# Patient Record
Sex: FEMALE | Race: WHITE | NOT HISPANIC OR LATINO | Employment: STUDENT | URBAN - METROPOLITAN AREA
[De-identification: names, ages, dates, MRNs, and addresses within clinical notes are randomized per-mention and may not be internally consistent; named-entity substitution may affect disease eponyms.]

---

## 2023-06-13 NOTE — PROGRESS NOTES
[facility]  CF Clinic - Initial Visit -  June 13, 2023  Reason for Visit  Valencia Casiano is 17 year old who is being seen for Cystic Fibrosis (F/u)         Assessment and Plan:   Valencia Casiano is a 17 year old with history of CF, [facility], who is seen today for evaluation of CF.     CF Pulmonary Disease:  Maintenance   Modulator: Trikafta, misses 2-3 doses a month, started mid to early 2019  Mutation: [id number] sweat 104,105, dx age 1yo  AW Clearance: vest when needed  Bronchodilators: Albuterol nebs prn, and has albuterol HFA  Mucolytics: Pulmzyme, HTS 7% BID has not been doing these  Antibiotics Inh:   Antibiotics Oral:   Exercise: None scheduled  Colonization: MRSA from throat cultures, MSSA, Pseudomonas fluorescens, stenotrophomonas, aspergillus  Other: Symbicort 80 1 puff bid  Exocrine Pancreatic Insufficiency:  Creon 24 5 with meals 3-4 with snacks.     ABPA: Prior history, she does not recall if she was on chronic steroids. She is not currently on ICS or steroids.   History of Migraines: Better controlled on effexor.     Episodic Abdominal Pain: Saw Gi for intermittent acute abdominal pain with chronic constipation and bloating. She wonders if it's related to recurrent constipation and hasn't been using consistent miralax. Recurrent constipation worsened on trikafta. No hx of recurrent ANABELA as a child.   - Recommend miralax daily  - Repeat fecal elastase  - Has self downtitrated her Creon to 4 capsules with meals    Family Planning:  No intention to get pregnant super soon. However, she would like to get rid of her IUD. She has had nexplanon and had constant bleeding, she is inconsistent with OCPs. Not interested in discussion regarding further options. Would like genetic counseling referral for her and her partner.     Annuals: Missed some of them normally does them in February, discussed with Valencia and her mom and will  "plan for December.     June 16, 2023  CF Exacerbation  Absent        I personally spent 60 minutes in documentation, the interview and exam, and review of the chart/labs/imaging on Jun 16, 2023 not including time spent interpreting spirometry.     Charley Cassidy MD  [facility]  Cystic Fibrosis Center  RN Coordinators: Jumana [phone]        CF History of Present Illness:   Valencia Casiano is a 17 year old female with history of CF, recurrent abdominal pain/constipation (?ANABELA), who is seen today for evaluation of CF.   She is transferring from [facility].     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   Only uses the vest or airway clearance when she's sick. She hasn't used her vest in about 2 years. Not actively using any bronchodilators or ICS. Last time she needed antibiotics (oral) was like 4 years ago, and IVs also about 4 years ago and would need antibiotics about 2-3 times a year. Treated for ABPA about 6- 7 years ago with steroids and itraconazole, but no chronic steroids since then. Very rare cough, 1-2 times a week, occasionally productive of greenish yellow sputum but no blood and not persistent. She has received steroids in the past during exacerbations with some relief in chest tightness.   Sinus:   Has chronic postnasal drainage. Has a nasal spray, no ansomia or dysgeusia. Used to take zyrtec at one point. Notes that nasal congestion worsens during the summer. Used to get allergy shots. No sinus pressure. Alternating sides of nasal cognestion.   Migraines: Had them worse in the winter, sharp pain behind the right eye, +auras with \"stars\" during, effexor started with some improvement in frequency.   CFRD: She has had to use insulin when she would get steroids during exacerbations but not typically.   GI: Bloating all the time. She has cut back on her enzymes to 3-4 with meals and snacks. No change to bloating. She'll get bad stomach pain in her mid " upper epigastric region. She went to the ED and they gave her milk of mag  And x2 enemas and miralax. She is prescribed miralax and takes it about once a week. She has gone a week without pooping and has 0-1 episodes. If she forgets her enzymes she doesn't notice a difference in stools but does occasionally give her a stomach ache. Has intermittent acid reflux, maybe 2 episodes out of a month.     Inhalational activities: Vapes nicotine pods, has tried to quit without success, has not tried nicotine replacement is interested in quitting. Mother does not know, and probably has access to chart so asked not to share. Occasionally does 1-2 times a year smoking marijuana.   - Trialing nicotine patches      The patient was seen and examined by Charley Cassidy MD          Review of Systems:     Skin: negative  Eyes: negative  Ears/Nose/Throat: negative, as above  Respiratory: No shortness of breath, dyspnea on exertion, cough, or hemoptysis  Cardiovascular: negative  Gastrointestinal:  as above  Genitourinary: negative  Musculoskeletal: negative  Neurologic: negative  Psychiatric: negative  Hematologic/Lymphatic/Immunologic: negative  Endocrine: as above     A complete ROS was otherwise negative except as noted in the HPI.          Problem List:          Past Medical and Surgical History:     No past medical history on file.  No past surgical history on file.    PMH reviewed  CF   Recurrent constipation  Pancreatic insufficiency  ABPA        Family History:     2 siblings both younger, neither have CF, no other relatives with CF             Social History:     Social History     Socioeconomic History     Marital status: Single     Spouse name: Not on file     Number of children: Not on file     Years of education: Not on file     Highest education level: Not on file   Occupational History     Not on file   Tobacco Use     Smoking status: Not on file     Smokeless tobacco: Not on file   Substance and Sexual Activity      Alcohol use: Not on file     Drug use: Not on file     Sexual activity: Not on file   Other Topics Concern     Not on file   Social History Narrative     Not on file     Social Determinants of Health     Financial Resource Strain: Not on file   Food Insecurity: Not on file   Transportation Needs: Not on file   Physical Activity: Not on file   Stress: Not on file   Social Connections: Not on file   Intimate Partner Violence: Not on file   Housing Stability: Not on file     - 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 Seeing her boyfriend for 4 years.   - Dad has a traumatic brain injury so mom often accompanies her to appointments.        Medications:     No current outpatient medications on file prior to visit.  No current facility-administered medications on file prior to visit.    Medications reviewed with patient in clinic    Outpatient Encounter Medications as of 6/16/2023   Medication Sig Dispense Refill     albuterol (PROAIR HFA/PROVENTIL HFA/VENTOLIN HFA) 108 (90 Base) MCG/ACT inhaler Inhale 1-2 puffs into the lungs every 4 hours as needed for shortness of breath or wheezing 18 g 3     albuterol (PROVENTIL) (2.5 MG/3ML) 0.083% neb solution Take 1 vial (2.5 mg) by nebulization 4 times daily as needed for exacerbation 270 mL 3     cetirizine (ZYRTEC) 10 MG tablet Take 1 tablet (10 mg) by mouth daily 90 tablet 3     elexacaftor-tezacaftor-ivacaftor & ivacaftor (TRIKAFTA) 100-50-75 & 150 MG tablet pack Take 2 orange tablets in the morning and 1 light blue tablet in the evening. Swallow whole with fat-containing food. 84 tablet 5     fluticasone (FLONASE) 50 MCG/ACT nasal spray Spray 2 sprays into both nostrils daily as needed for rhinitis or allergies 16 g 11     lipase-protease-amylase (CREON) 51045-47707-770818 units CPEP per EC capsule Take 4 capsules by  mouth 3 times daily (with meals) and 4 capsules with snacks 3 times daily. 3 snacks/3 meals per day. 720 capsule 11     [START ON 7/28/2023] nicotine (NICODERM CQ) 14 MG/24HR 24 hr patch Place 1 patch onto the skin every 24 hours for 14 days 14 patch 0     nicotine (NICODERM CQ) 21 MG/24HR 24 hr patch Place 1 patch onto the skin every 24 hours for 42 days 42 patch 0     [START ON 8/12/2023] nicotine (NICODERM CQ) 7 MG/24HR 24 hr patch Place 1 patch onto the skin every 24 hours for 14 days 14 patch 0     polyethylene glycol (MIRALAX) 17 GM/Dose powder Take 17 g by mouth daily 510 g 11     sodium chloride inhalant 7 % NEBU neb solution Take 4 mLs by nebulization up to four times daily for exacerbations. 720 mL 3     No facility-administered encounter medications on file as of 6/16/2023.      Orders Placed This Encounter   Procedures     Spirometry, Breathing Capacity, Normal Order, Clinic Performed             Allergies:     Allergies   Allergen Reactions     Vancomycin Hives     Get's shoshana syndrome and itching: She tolerates it with slowing the infusion and premed with benadryl     Latex Itching     Latex tape- breaks out in hives             Physical Exam:   There were no vitals taken for this visit.    GENERAL: alert, NAD  HEENT: NCAT, EOMI, no scleral icterus, oral mucosa moist and without lesions  Neck: no cervical or supraclavicular adenopathy  Lungs: good air flow, no crackles, rhonchi or wheezing  CV: RRR, S1S2, no murmurs noted  Abdomen: normoactive BS, soft, non tender  Neuro: AAO X 3  Psychiatric: normal affect, good eye contact  Skin: no rash, jaundice or lesions on limited exam  Extremities: No clubbing, cyanosis or edema.  No digital edema, no synovitis or joint swelling.  No ulcers, skin thickening or fissure.         Data:   All laboratory and imaging data reviewed.      No results found for this or any previous visit (from the past 168 hour(s)).      PFT interpretation:   Jun 16, 2023    Spirometry  interpretation:  The spirometry is normal.  When compared to [facility], the FEV1 and FVC have little change.  The testing meets ATS criteria.      Date Place TLC (%) FVC (%) FEV1 (%) FEV1/FVC DLCO (%) Note   6/16/23    3.37 115 2.89 109 86                                                  6MWT Distance:        Micro Hx:                 Imaging:     CF Exacerbation  Absent      Charley Cassidy MD  Pager: [phone]

## 2023-06-16 ENCOUNTER — OFFICE VISIT (OUTPATIENT)
Dept: PHARMACY | Facility: CLINIC | Age: 18
End: 2023-06-16
Payer: COMMERCIAL

## 2023-06-16 ENCOUNTER — OFFICE VISIT (OUTPATIENT)
Dept: PULMONOLOGY | Facility: CLINIC | Age: 18
End: 2023-06-16
Attending: INTERNAL MEDICINE
Payer: COMMERCIAL

## 2023-06-16 ENCOUNTER — TELEPHONE (OUTPATIENT)
Dept: PULMONOLOGY | Facility: CLINIC | Age: 18
End: 2023-06-16

## 2023-06-16 VITALS — HEART RATE: 58 BPM | SYSTOLIC BLOOD PRESSURE: 103 MMHG | OXYGEN SATURATION: 100 % | DIASTOLIC BLOOD PRESSURE: 69 MMHG

## 2023-06-16 DIAGNOSIS — E84.9 CF (CYSTIC FIBROSIS) (H): Primary | ICD-10-CM

## 2023-06-16 DIAGNOSIS — G44.209 TENSION HEADACHE: ICD-10-CM

## 2023-06-16 DIAGNOSIS — E84.9 CYSTIC FIBROSIS (H): Primary | ICD-10-CM

## 2023-06-16 DIAGNOSIS — K86.89 PANCREATIC INSUFFICIENCY: ICD-10-CM

## 2023-06-16 DIAGNOSIS — F17.299 OTHER TOBACCO PRODUCT NICOTINE DEPENDENCE WITH NICOTINE-INDUCED DISORDER: ICD-10-CM

## 2023-06-16 DIAGNOSIS — J30.1 SEASONAL ALLERGIC RHINITIS DUE TO POLLEN: ICD-10-CM

## 2023-06-16 LAB
EXPTIME-PRE: 8.07 SEC
FEF2575-%PRED-PRE: 89 %
FEF2575-PRE: 3 L/SEC
FEF2575-PRED: 3.36 L/SEC
FEFMAX-%PRED-PRE: 113 %
FEFMAX-PRE: 6.93 L/SEC
FEFMAX-PRED: 6.09 L/SEC
FEV1-%PRED-PRE: 109 %
FEV1-PRE: 2.89 L
FEV1FEV6-PRE: 86 %
FEV1FEV6-PRED: 87 %
FEV1FVC-PRE: 86 %
FEV1FVC-PRED: 91 %
FIFMAX-PRE: 5.48 L/SEC
FVC-%PRED-PRE: 115 %
FVC-PRE: 3.37 L
FVC-PRED: 2.92 L

## 2023-06-16 PROCEDURE — 99605 MTMS BY PHARM NP 15 MIN: CPT | Performed by: PHARMACIST

## 2023-06-16 PROCEDURE — 99205 OFFICE O/P NEW HI 60 MIN: CPT | Mod: 25 | Performed by: INTERNAL MEDICINE

## 2023-06-16 PROCEDURE — 87070 CULTURE OTHR SPECIMN AEROBIC: CPT | Performed by: INTERNAL MEDICINE

## 2023-06-16 PROCEDURE — 94375 RESPIRATORY FLOW VOLUME LOOP: CPT | Performed by: INTERNAL MEDICINE

## 2023-06-16 PROCEDURE — G0463 HOSPITAL OUTPT CLINIC VISIT: HCPCS | Performed by: INTERNAL MEDICINE

## 2023-06-16 PROCEDURE — 99607 MTMS BY PHARM ADDL 15 MIN: CPT | Performed by: PHARMACIST

## 2023-06-16 RX ORDER — NICOTINE 21 MG/24HR
1 PATCH, TRANSDERMAL 24 HOURS TRANSDERMAL EVERY 24 HOURS
Qty: 14 PATCH | Refills: 0 | Status: SHIPPED | OUTPATIENT
Start: 2023-07-28 | End: 2023-08-11

## 2023-06-16 RX ORDER — POLYETHYLENE GLYCOL 3350 17 G/17G
17 POWDER, FOR SOLUTION ORAL DAILY
Qty: 510 G | Refills: 11 | Status: SHIPPED | OUTPATIENT
Start: 2023-06-16 | End: 2024-01-18

## 2023-06-16 RX ORDER — SODIUM CHLORIDE FOR INHALATION 7 %
VIAL, NEBULIZER (ML) INHALATION
Qty: 720 ML | Refills: 3 | Status: SHIPPED | OUTPATIENT
Start: 2023-06-16

## 2023-06-16 RX ORDER — ALBUTEROL SULFATE 90 UG/1
1-2 AEROSOL, METERED RESPIRATORY (INHALATION) EVERY 4 HOURS PRN
Qty: 18 G | Refills: 3 | Status: SHIPPED | OUTPATIENT
Start: 2023-06-16

## 2023-06-16 RX ORDER — FLUTICASONE PROPIONATE 50 MCG
2 SPRAY, SUSPENSION (ML) NASAL DAILY PRN
Qty: 16 G | Refills: 11 | Status: SHIPPED | OUTPATIENT
Start: 2023-06-16

## 2023-06-16 RX ORDER — ALBUTEROL SULFATE 0.83 MG/ML
2.5 SOLUTION RESPIRATORY (INHALATION) 4 TIMES DAILY
Qty: 270 ML | Refills: 3 | Status: SHIPPED | OUTPATIENT
Start: 2023-06-16

## 2023-06-16 RX ORDER — NICOTINE 21 MG/24HR
1 PATCH, TRANSDERMAL 24 HOURS TRANSDERMAL EVERY 24 HOURS
Qty: 42 PATCH | Refills: 0 | Status: SHIPPED | OUTPATIENT
Start: 2023-06-16 | End: 2023-07-28

## 2023-06-16 RX ORDER — CETIRIZINE HYDROCHLORIDE 10 MG/1
10 TABLET ORAL DAILY
Qty: 90 TABLET | Refills: 3 | Status: SHIPPED | OUTPATIENT
Start: 2023-06-16 | End: 2024-07-18

## 2023-06-16 NOTE — NURSING NOTE
"Valencia Casiano is a 17 year old year old who is being seen for Cystic Fibrosis (F/u)      Medications reviewed and Vital signs taken.    Specimen Collection Type: Throat Swab    Order(s) placed: CF Aerobic Bacterial    *IF AFB order placed - please enter \"PRIORITIZE AFB\" to order comments.       No results found for: ACIDFAST      No results found for: AFBSMS          "

## 2023-06-16 NOTE — PROGRESS NOTES
Medication Therapy Management (MTM) Encounter    ASSESSMENT:                            Medication Adherence/Access: No issues identified    CF: Due for Trikafta labs with December visit, per Dr. Cassidy. Discussed importance of taking Trikafta with a fat-containing snack or meal to ensure appropriate absorption of Trikafta.     Pancreatic Insufficiency/Nutrition: annual vitamin labs due in December per Dr. Cassidy. Per visit with Valencia Devries to start using Miralax consistently.    Headaches: Stable. Discussed risk of rebound headaches with frequent use, no concerns of this at this time.    PLAN:                            1. Keep up the great work managing your medications! We will get Trikafta labs in December.    2. Sent refills to [facility] of Zyrtec, Miralax, fluticasone nasal spray, albuterol neb, albuterol inhaler, and hypertonic saline 7% nebs    Follow-up: December for Trikafta labs; MTM visit 1 year or sooner if needed    SUBJECTIVE/OBJECTIVE:                          Valencia Casiano is a 17 year old female coming in for an initial visit. She was referred to me from Dr. Charley Cassidy MD. Today's visit is a co-visit with Dr. Cassidy.     Reason for visit: comprehensive medication review.    Allergies/ADRs: Reviewed in chart  Past Medical History: Reviewed in chart  Tobacco: She has no history on file for tobacco use.  Alcohol: rarely     Medication Adherence/Access: Medication Access: Patient fills medication at [facility]. Patient expresses no concern regarding cost of medications. She requests refills of Zyrtec, Miralax, fluticasone nasal spray, albuterol nebs, albuterol inhaler, and hypertonic saline.  Medication Administration: Per patient, misses Moralesfta 1 times per week.     CF: Patient is currently taking the following medications:  Bronchodilator: albuterol nebs 0.083% daily as needed for exacerbations and albuterol inhaler (supply may be )  Mucolytic: hypertonic  saline 7% nebs as needed for exacerbations  Antibiotic: Not indicated  Azithromycin: not indicated  CFTR modulator: Trikafta (full dose) with fat-containing meal, when possible. Denies side effects.  Other: fluticasone (Flonase) 50 mcg/act 1-2 sprays  once daily as needed, cetirizine 10 mg as needed  Patient reports no concerns about CF medication regimen.  Pulmonary symptoms are stable  Genotype: [id number]        Latest Ref Rng & Units 6/16/2023    10:09 AM   PFT   FVC L 3.37  P   FEV1 L 2.89  P   FVC% % 115  P   FEV1% % 109  P      P Preliminary result     Pancreatic Insufficiency/Nutrition: Pancreatic enzyme replacement with Creon 63402.  Patient is taking 4 capsules with meals and 4 capsules with snacks. Patient is experiencing sign/symptoms of malabsorption/intermittent abdominal pain.  Acid reducer: none  Bowel regimen: Miralax 17 g once daily as needed  Vitamins include: vitamin 50,000 units once weekly    Headaches: Takes Naproxen 250 mg as needed for headaches. She reports taking 2 tablets this month, finds headaches are more common in winter. No concerns noted today.  ----------------  I spent 15 minutes with this patient today. All changes were made via verbal approval with Dr. Charley Cassidy MD. A copy of the visit note was provided to the patient's provider(s).    A summary of these recommendations was given to the patient (see AVS from today's appointment with Dr. Charley Cassidy MD).    Karla Amaral, PharmD   Medication Therapy Management   Cystic Fibrosis Pharmacist     Medication Therapy Recommendations  Cystic fibrosis (H)    Current Medication: elexacaftor-tezacaftor-ivacaftor & ivacaftor (TRIKAFTA) 100-50-75 & 150 MG tablet pack   Rationale: Incorrect administration - Dosage too low - Effectiveness   Recommendation: Provide Education   Status: Patient Agreed - Adherence/Education

## 2023-06-16 NOTE — LETTER
6/16/2023         RE: Valencia Casiano  [address]  [place] [address]        Dear Colleague,    Thank you for referring your patient, Valencia Casiano, to the [facility]G [facility]ENCE AND [facility]IN[facility] [place]. Please see a copy of my visit note below.    [facility] Lung Science and Health  CF Clinic - Initial Visit -  June 13, 2023  Reason for Visit  Valencia Casiano is 17 year old who is being seen for Cystic Fibrosis (F/u)         Assessment and Plan:   Valencia Casiano is a 17 year old with history of CF, [facility], who is seen today for evaluation of CF.     CF Pulmonary Disease:  Maintenance   Modulator: Trikafta, misses 2-3 doses a month, started mid to early 2019  Mutation: [id number] sweat 104,105, dx age 1yo  AW Clearance: vest when needed  Bronchodilators: Albuterol nebs prn, and has albuterol HFA  Mucolytics: Pulmzyme, HTS 7% BID has not been doing these  Antibiotics Inh:   Antibiotics Oral:   Exercise: None scheduled  Colonization: MRSA from throat cultures, MSSA, Pseudomonas fluorescens, stenotrophomonas, aspergillus  Other: Symbicort 80 1 puff bid  Exocrine Pancreatic Insufficiency:  Creon 24 5 with meals 3-4 with snacks.     ABPA: Prior history, she does not recall if she was on chronic steroids. She is not currently on ICS or steroids.   History of Migraines: Better controlled on effexor.     Episodic Abdominal Pain: Saw Gi for intermittent acute abdominal pain with chronic constipation and bloating. She wonders if it's related to recurrent constipation and hasn't been using consistent miralax. Recurrent constipation worsened on trikafta. No hx of recurrent ANABELA as a child.   - Recommend miralax daily  - Repeat fecal elastase  - Has self downtitrated her Creon to 4 capsules with meals    Family Planning:  No intention to get pregnant super soon. However, she would like to get rid of her IUD. She has had nexplanon and  "had constant bleeding, she is inconsistent with OCPs. Not interested in discussion regarding further options. Would like genetic counseling referral for her and her partner.     Annuals: Missed some of them normally does them in February, discussed with Valencia and her mom and will plan for December.     June 16, 2023  CF Exacerbation  Absent        I personally spent 60 minutes in documentation, the interview and exam, and review of the chart/labs/imaging on Jun 16, 2023 not including time spent interpreting spirometry.     Charley Cassidy MD  [facility]  Cystic Fibrosis Center  RN Coordinators: CATHRYN/Rahel/Reginaldo [phone]        CF History of Present Illness:   Valencia Casiano is a 17 year old female with history of CF, recurrent abdominal pain/constipation (?ANABELA), who is seen today for evaluation of CF.   She is transferring from [facility].     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   Only uses the vest or airway clearance when she's sick. She hasn't used her vest in about 2 years. Not actively using any bronchodilators or ICS. Last time she needed antibiotics (oral) was like 4 years ago, and IVs also about 4 years ago and would need antibiotics about 2-3 times a year. Treated for ABPA about 6- 7 years ago with steroids and itraconazole, but no chronic steroids since then. Very rare cough, 1-2 times a week, occasionally productive of greenish yellow sputum but no blood and not persistent. She has received steroids in the past during exacerbations with some relief in chest tightness.   Sinus:   Has chronic postnasal drainage. Has a nasal spray, no ansomia or dysgeusia. Used to take zyrtec at one point. Notes that nasal congestion worsens during the summer. Used to get allergy shots. No sinus pressure. Alternating sides of nasal cognestion.   Migraines: Had them worse in the winter, sharp pain behind the right eye, +auras with \"stars\" during, effexor started with some " improvement in frequency.   CFRD: She has had to use insulin when she would get steroids during exacerbations but not typically.   GI: Bloating all the time. She has cut back on her enzymes to 3-4 with meals and snacks. No change to bloating. She'll get bad stomach pain in her mid upper epigastric region. She went to the ED and they gave her milk of mag  And x2 enemas and miralax. She is prescribed miralax and takes it about once a week. She has gone a week without pooping and has 0-1 episodes. If she forgets her enzymes she doesn't notice a difference in stools but does occasionally give her a stomach ache. Has intermittent acid reflux, maybe 2 episodes out of a month.     Inhalational activities: Vapes nicotine pods, has tried to quit without success, has not tried nicotine replacement is interested in quitting. Mother does not know, and probably has access to chart so asked not to share. Occasionally does 1-2 times a year smoking marijuana.   - Trialing nicotine patches      The patient was seen and examined by Charley Cassidy MD          Review of Systems:     Skin: negative  Eyes: negative  Ears/Nose/Throat: negative, as above  Respiratory: No shortness of breath, dyspnea on exertion, cough, or hemoptysis  Cardiovascular: negative  Gastrointestinal:  as above  Genitourinary: negative  Musculoskeletal: negative  Neurologic: negative  Psychiatric: negative  Hematologic/Lymphatic/Immunologic: negative  Endocrine: as above     A complete ROS was otherwise negative except as noted in the HPI.          Problem List:          Past Medical and Surgical History:     No past medical history on file.  No past surgical history on file.    PMH reviewed  CF   Recurrent constipation  Pancreatic insufficiency  ABPA        Family History:     2 siblings both younger, neither have CF, no other relatives with CF             Social History:     Social History     Socioeconomic History     Marital status: Single     Spouse name:  Not on file     Number of children: Not on file     Years of education: Not on file     Highest education level: Not on file   Occupational History     Not on file   Tobacco Use     Smoking status: Not on file     Smokeless tobacco: Not on file   Substance and Sexual Activity     Alcohol use: Not on file     Drug use: Not on file     Sexual activity: Not on file   Other Topics Concern     Not on file   Social History Narrative     Not on file     Social Determinants of Health     Financial Resource Strain: Not on file   Food Insecurity: Not on file   Transportation Needs: Not on file   Physical Activity: Not on file   Stress: Not on file   Social Connections: Not on file   Intimate Partner Violence: Not on file   Housing Stability: Not on file     - 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 Seeing her boyfriend for 4 years.   - Dad has a traumatic brain injury so mom often accompanies her to appointments.        Medications:     No current outpatient medications on file prior to visit.  No current facility-administered medications on file prior to visit.    Medications reviewed with patient in clinic    Outpatient Encounter Medications as of 6/16/2023   Medication Sig Dispense Refill     albuterol (PROAIR HFA/PROVENTIL HFA/VENTOLIN HFA) 108 (90 Base) MCG/ACT inhaler Inhale 1-2 puffs into the lungs every 4 hours as needed for shortness of breath or wheezing 18 g 3     albuterol (PROVENTIL) (2.5 MG/3ML) 0.083% neb solution Take 1 vial (2.5 mg) by nebulization 4 times daily as needed for exacerbation 270 mL 3     cetirizine (ZYRTEC) 10 MG tablet Take 1 tablet (10 mg) by mouth daily 90 tablet 3     elexacaftor-tezacaftor-ivacaftor & ivacaftor (TRIKAFTA) 100-50-75 & 150 MG tablet pack Take 2 orange tablets in the morning and 1 light blue tablet in the evening.  Swallow whole with fat-containing food. 84 tablet 5     fluticasone (FLONASE) 50 MCG/ACT nasal spray Spray 2 sprays into both nostrils daily as needed for rhinitis or allergies 16 g 11     lipase-protease-amylase (CREON) 62346-89026-006574 units CPEP per EC capsule Take 4 capsules by mouth 3 times daily (with meals) and 4 capsules with snacks 3 times daily. 3 snacks/3 meals per day. 720 capsule 11     [START ON 7/28/2023] nicotine (NICODERM CQ) 14 MG/24HR 24 hr patch Place 1 patch onto the skin every 24 hours for 14 days 14 patch 0     nicotine (NICODERM CQ) 21 MG/24HR 24 hr patch Place 1 patch onto the skin every 24 hours for 42 days 42 patch 0     [START ON 8/12/2023] nicotine (NICODERM CQ) 7 MG/24HR 24 hr patch Place 1 patch onto the skin every 24 hours for 14 days 14 patch 0     polyethylene glycol (MIRALAX) 17 GM/Dose powder Take 17 g by mouth daily 510 g 11     sodium chloride inhalant 7 % NEBU neb solution Take 4 mLs by nebulization up to four times daily for exacerbations. 720 mL 3     No facility-administered encounter medications on file as of 6/16/2023.      Orders Placed This Encounter   Procedures     Spirometry, Breathing Capacity, Normal Order, Clinic Performed             Allergies:     Allergies   Allergen Reactions     Vancomycin Hives     Get's shoshana syndrome and itching: She tolerates it with slowing the infusion and premed with benadryl     Latex Itching     Latex tape- breaks out in hives             Physical Exam:   There were no vitals taken for this visit.    GENERAL: alert, NAD  HEENT: NCAT, EOMI, no scleral icterus, oral mucosa moist and without lesions  Neck: no cervical or supraclavicular adenopathy  Lungs: good air flow, no crackles, rhonchi or wheezing  CV: RRR, S1S2, no murmurs noted  Abdomen: normoactive BS, soft, non tender  Neuro: AAO X 3  Psychiatric: normal affect, good eye contact  Skin: no rash, jaundice or lesions on limited exam  Extremities: No clubbing, cyanosis or edema.   "No digital edema, no synovitis or joint swelling.  No ulcers, skin thickening or fissure.         Data:   All laboratory and imaging data reviewed.      No results found for this or any previous visit (from the past 168 hour(s)).      PFT interpretation:   Jun 16, 2023    Spirometry interpretation:  The spirometry is normal.  When compared to [facility], the FEV1 and FVC have little change.  The testing meets ATS criteria.      Date Place TLC (%) FVC (%) FEV1 (%) FEV1/FVC DLCO (%) Note   6/16/23    3.37 115 2.89 109 86                                                  6MWT Distance:        Micro Hx:                 Imaging:     CF Exacerbation  Absent      Charley Cassidy MD  Pager: [phone]          Nutrition Note    Reason for Visit: Introduction to [place] [facility].     Met with pt and mother with new transition from [facility].      Concerns today mostly regarding bloating, constipation. Currently taking Creon 24,000 - 3 caps with meals and snacks = 1470 units lipase/kg/meal. Has followed with local GI. Not consistent with using Miralax.     Reports she is \"okay\" with fluid intake- mostly water, coffee, pop, juice. Often skips breakfast (no fat with Trikafta) and may skip lunch as well - reports due to no appetite.      Interventions/Recommendations:  1) Pt/Mom wondering if she still needs enzymes. Plan to obtain fecal elastase locally at [place] to evaluate pancreatic status. Will make recommendations to enzymes at that time.   2) Noted that pt frequently taking Trikafta without fat intake in AM. Discussed options aiming for 8-12+ grams/fat per dose minimum such as protein shakes, whole milk, handful of nuts, etc as this may also help with GI symptoms.     Plan to obtain full annual nutrition assessment at upcoming clinic visits as able.     Margie Smith RD, LD, CACFD  Cystic Fibrosis/Lung Transplant Dietitian  Pager [phone]          Again, thank you for allowing me to participate in " the care of your patient.        Sincerely,        Charley Cassidy MD

## 2023-06-16 NOTE — PATIENT INSTRUCTIONS
Cystic Fibrosis Self-Care Plan    RECOMMENDATIONS:   Help us provide the best possible care. If you receive a questionnaire from the CF Foundation about your clinic experience today please fill it out.  It should take less than 5 minutes. Let us know what we are doing well and how we can improve.      YOUR GOAL:   - Your new prescription is being sent to the pharmacy please message or call us if you have questions (do not do your regular activities while simultaneously using this medication)       Cystic Fibrosis :    Brittany Gao  [phone]  [place] [facility] [place] Nurse line:  Clara    [phone]     [facility] [place] Fax Number:      [phone]         Cystic Fibrosis Respiratory Therapists:   Tracy Malagon                          [phone]          Emerald Pedro   [phone]  Cystic Fibrosis Dietitians:              Margie Smith              [phone]                            Cele Chowdhury                        [phone]   Cystic Fibrosis Diabetes Nurse:    Kirsty Garvin               [phone]    Cystic Fibrosis Social Workers:     Sanna Head               [phone]                     Amy Heller               [phone]  Cystic Fibrosis Pharmacists:           Karla Amaral                              [phone] (pager)         Charley Shepherd      [phone]   Cystic Fibrosis Genetic Counselor:   Jen Mckeon    [phone]    [facility] [place] website:  [email or web address]    We have recently learned about an albuterol neb solution shortage due to a manufacturing delay. There is still a small supply coming in but not enough to meet the current demand. We do not yet have an estimate for when this will become widely available again.    We our asking our CF community to do the following:    Please take time to check your supply of albuterol neb solution at home. We recommend keeping at least a 2-week supply of albuterol  nebs at home in case of illness.    2.  If you have an albuterol inhaler at home, you can use 4 puffs of the inhaler with a spacer in place of the nebulized albuterol at the start of your treatments. It is important to use a spacer for the best technique. If you do not have a spacer at home or have questions on technique, we will be happy to review and send one to your home address. Please also take a moment to check that your albuterol HFA inhaler is available and not .  inhalers may be less effective as the medication loses its potency or power. In some instances your team may suggest another alternative instead of an albuterol inhaler.    3.  Please take care in requesting refills. Albuterol neb solution is a life-saving medication for patients having severe asthma attacks and other emergency respiratory conditions. Let s work together to make sure that albuterol neb solution is available to those who need it urgently.    Reach out to your care team with questions and to confirm your planned alternative for albuterol nebs. [facility] will be the fastest way to connect. If possible, please reserve the nursing line for more urgent concerns as we are short on nursing staff.    Here are some reputable sites with more information:    https://www.cidrap.[facility].edu/resilient-drug-supply/us-liquid-albuterol-shifffbk-fxrdytao-nispti-after-major-supplier-shuts-down    https://www.[employer]//health/albuterol-shortage    https://www.ashp.org/drug-shortages/current-shortages/drug-shortage-detail.aspx?mf=451&loginreturnUrl=SSOCheckOnly       MRN: [id number]   Clinic Date: 2023   Patient: Valencia Casiano     Annual Studies:   No results found for: IGG  No results found for: INS  There are no preventive care reminders to display for this patient.    Pulmonary Function Tests  FEV1: amount of air you can blow out in 1 second  FVC: total amount of air you can take in and blow out    Your Goals:              Latest Ref Rng & Units 6/16/2023    10:09 AM   PFT   FVC L 3.37  P   FEV1 L 2.89  P   FVC% % 115  P   FEV1% % 109  P      P Preliminary result          Airway Clearance: The Most Important Way to Keep Your Lungs Healthy  Vest Settings:   Hill-Rom Frequencies: 8, 9, 10 Pressure 10 Then, Frequencies 18, 19, 20 Pressure 6     RespirTech: Quick Start with Pressure of     Do each frequency for 5 minutes; Deflate vest after each frequency & cough 3 times before beginning the next setting.    Vest and Neb Therapy should be done as needed    Good Nutrition Can Improve Lung Function and Overall Health    Take ALL of your vitamins with food    Take 1/2 of your enzymes before EVERY meal/snack and the other 1/2 mid-meal/snack    Wt Readings from Last 3 Encounters:   No data found for Wt       There is no height or weight on file to calculate BMI.         National CF Foundation Recommendations for BMI in CF Adults: Women: at least 22 Men: at least 23        Controlling Blood Sugars Helps Prevent Lung Infections & Improves Nutrition  Test blood sugar:    In the morning before eating (goal is )    2 hours after a meal (goal is less than 150)    When pre-meal glucose is greater than 150 add correction    At bedtime (if less than 100 eat a snack with 15 grams of carbohydrates  Last A1C Results: No results found for: A1C      If diabetic, measure A1C every 6 months. Goal: Under 7%    Staying Healthy  Research:  If you are interested in learning about research opportunities or have questions, please contact the CF Research Team at [phone] or [email or web address].    CF Foundation:  Compass is a personalized resource service to help you with the insurance, financial, legal and other issues you are facing.  It's free, confidential and available to anyone with CF.  Ask your  for more information or contact Compass directly at 816-COMPASS ([phone]) or compass@cff.org, or learn more at cff.org/compass.

## 2023-06-16 NOTE — TELEPHONE ENCOUNTER
[facility] Call Center    Phone Message    May a detailed message be left on voicemail: yes     Reason for Call: Medication Question or concern regarding medication   Prescription Clarification  Name of Medication: nicotine patches  Prescribing Provider: Dr Cassidy   Pharmacy: [facility]   What on the order needs clarification? Dosing info needs clarification          Action Taken: Other: pulm    Travel Screening: Not Applicable

## 2023-06-19 ENCOUNTER — TELEPHONE (OUTPATIENT)
Dept: PULMONOLOGY | Facility: CLINIC | Age: 18
End: 2023-06-19
Payer: COMMERCIAL

## 2023-06-19 NOTE — PROGRESS NOTES
"Nutrition Note    Reason for Visit: Introduction to [place] [facility].     Met with pt and mother with new transition from [facility].      Concerns today mostly regarding bloating, constipation. Currently taking Creon 24,000 - 3 caps with meals and snacks = 1470 units lipase/kg/meal. Has followed with local GI. Not consistent with using Miralax.     Reports she is \"okay\" with fluid intake- mostly water, coffee, pop, juice. Often skips breakfast (no fat with Trikafta) and may skip lunch as well - reports due to no appetite.      Interventions/Recommendations:  1) Pt/Mom wondering if she still needs enzymes. Plan to obtain fecal elastase locally at [place] to evaluate pancreatic status. Will make recommendations to enzymes at that time.   2) Noted that pt frequently taking Trikafta without fat intake in AM. Discussed options aiming for 8-12+ grams/fat per dose minimum such as protein shakes, whole milk, handful of nuts, etc as this may also help with GI symptoms.     Plan to obtain full annual nutrition assessment at upcoming clinic visits as able.     Margie Smith RD, LD, CACFD  Cystic Fibrosis/Lung Transplant Dietitian  Pager [phone]      "

## 2023-06-19 NOTE — TELEPHONE ENCOUNTER
Pharmacy had called for clarification of prescription. After clarifying with provider, pt and pharmacy called to update. 21 mg patches for 14 days then 14 for 14 days and then 7 for 14 days. Pt reported not needing more than 7 mg for 14 days. RN told pt that  the others will be available at the pharmacy and she does not need to pick them up if she does not want them.   Amanda Greene RN

## 2023-06-21 ENCOUNTER — TELEPHONE (OUTPATIENT)
Dept: PULMONOLOGY | Facility: CLINIC | Age: 18
End: 2023-06-21
Payer: COMMERCIAL

## 2023-06-21 LAB — BACTERIA SPEC CULT: NORMAL

## 2023-06-21 NOTE — TELEPHONE ENCOUNTER
"At the request of Dr. Charley Cassidy, I contacted Valencia to discuss her plans to begin a family and to help facilitate cystic fibrosis carrier screening for her partner.  I briefly reviewed the inheritance of cystic fibrosis and that any child Valencia has would be an obligate carrier for CF.  A child could actually have CF if her partner is a carrier.  Carrier screening is available to him if desired.  I outlined the process for carrier screening.  Valencia expressed understanding and indicated she is not planning a pregnancy anytime in the near future but appreciated the information \"just in case.\"  Valencia is encouraged to call me back if and when she is at this time in her life, as well as if she or her partner have any additional questions.       Jen Mckeon MS, [facility]  Genetic Counselor  [facility] [place] [facility]  [facility]     "

## 2023-06-22 ENCOUNTER — TELEPHONE (OUTPATIENT)
Dept: PULMONOLOGY | Facility: CLINIC | Age: 18
End: 2023-06-22
Payer: COMMERCIAL

## 2023-06-22 NOTE — TELEPHONE ENCOUNTER
Patient Contacted to schedule the following:    Appointment type: [facility] Return Cystic Fibrosis  Provider: Dr. French  Return date: On or around 9/16/23  Specialty phone number: [phone]  Additional appointment(s) needed: Spirometry  Additonal Notes: N/A    Spoke with patient, scheduled barby and follow up on 9/13.    Anders Fulton on 6/22/2023 at 6:11 PM

## 2023-06-23 RX ORDER — NAPROXEN 250 MG/1
250 TABLET ORAL PRN
COMMUNITY
End: 2023-10-12

## 2023-06-23 RX ORDER — ERGOCALCIFEROL 1.25 MG/1
50000 CAPSULE, LIQUID FILLED ORAL WEEKLY
COMMUNITY
End: 2023-08-17

## 2023-06-23 NOTE — PATIENT INSTRUCTIONS
See provider AVS for a summary of recommendations from today's visit.  Karla Amaral, PharmD  Cystic Fibrosis MTM Pharmacist  [facility] [place]

## 2023-06-28 ENCOUNTER — TELEPHONE (OUTPATIENT)
Dept: NUTRITION | Facility: CLINIC | Age: 18
End: 2023-06-28
Payer: COMMERCIAL

## 2023-06-28 NOTE — PROGRESS NOTES
Brief Clinical Nutrition Note    RDN called to discuss fecal elastase results with Valencia. Valencia understood fecal elastase level (<50) indicates need to continue enzymes. Plan to continue Creon 82354 4 with meals (1960 units/kg/meal). Patient to call if any questions or concerns.     Brenda Paredes MS, RDN, LDN  Pediatric Dietitian

## 2023-08-06 ENCOUNTER — HEALTH MAINTENANCE LETTER (OUTPATIENT)
Age: 18
End: 2023-08-06

## 2023-08-14 ENCOUNTER — TELEPHONE (OUTPATIENT)
Dept: PULMONOLOGY | Facility: CLINIC | Age: 18
End: 2023-08-14

## 2023-08-14 NOTE — TELEPHONE ENCOUNTER
PA Initiation    Medication: TRIKAFTA 100-50-75 & 150 MG PO TBPK  Insurance Company: [employer]AP - Phone [phone] Fax [phone]  Pharmacy Filling the Rx: [facility] - [place], [place] - [address]  Filling Pharmacy Phone:    Filling Pharmacy Fax:    Start Date: 8/14/2023    Key: [id number]    Approval with no dates, need approval dates once fax is received

## 2023-08-17 ENCOUNTER — MYC MEDICAL ADVICE (OUTPATIENT)
Dept: PULMONOLOGY | Facility: CLINIC | Age: 18
End: 2023-08-17
Payer: COMMERCIAL

## 2023-08-17 DIAGNOSIS — E84.9 CF (CYSTIC FIBROSIS) (H): Primary | ICD-10-CM

## 2023-08-17 RX ORDER — ERGOCALCIFEROL 1.25 MG/1
50000 CAPSULE, LIQUID FILLED ORAL WEEKLY
Qty: 52 CAPSULE | Refills: 0 | Status: SHIPPED | OUTPATIENT
Start: 2023-08-17 | End: 2023-10-12

## 2023-08-18 NOTE — TELEPHONE ENCOUNTER
Prior Authorization Approval    Medication: TRIKAFTA 100-50-75 & 150 MG PO TBPK  Authorization Effective Date: 9/1/2023  Authorization Expiration Date: 9/1/2024  Approved Dose/Quantity: 84 for 28 ds   Reference #: Key: [id number]   Insurance Company: [employer]AP - Phone [phone] Fax [phone]  Expected CoPay:       CoPay Card Available:      Financial Assistance Needed:   Which Pharmacy is filling the prescription: [facility] - [place], [place] - [address]  Pharmacy Notified:    Patient Notified:

## 2023-08-22 ENCOUNTER — TELEPHONE (OUTPATIENT)
Dept: PULMONOLOGY | Facility: CLINIC | Age: 18
End: 2023-08-22
Payer: COMMERCIAL

## 2023-08-22 DIAGNOSIS — E84.9 CF (CYSTIC FIBROSIS) (H): Primary | ICD-10-CM

## 2023-08-22 DIAGNOSIS — K59.00 CONSTIPATION, UNSPECIFIED CONSTIPATION TYPE: ICD-10-CM

## 2023-08-22 NOTE — TELEPHONE ENCOUNTER
"[facility]  August 22, 2023    No Ref-Primary, Physician    Cystic fibrosis Provider:  Charley Cassidy MD    Caller: Patient     Clinical information:  Valencia Casiano called with complaint of blood per rectum starting this morning. Noticed when she had a bowel movement, blood in toilet bowl as well as on toilet paper while wiping. Patient reports having stomachache this morning when she woke up, describes it as her whole stomach hurting. Feels bloated most of the time. Has had diarrhea for the last two days. And then Per clinic note from June patient has a history of chronic constipation, patient reports that she hasn't been on miralax in about a month, feels like \"it doesn't help\".     Plan:   Given patient's abdominal discomfort and blood per rectum patient was instructed to be evaluated today in ER or UC setting  Instructed to call back with update after being evaluated.    Caller verbalized understanding of plan and agrees with advice given.     Dede Mccloud RN     "

## 2023-08-22 NOTE — TELEPHONE ENCOUNTER
"1:00 pm update: patient was seen locally and evaluated.     Per local MD: \"We did do a anoscopy today, there were no huge ugly hemorrhoids that I could see, there was a little bit of increase blood vessel pattern, but no severe changes that would suggest a bad proctitis which is inflammation of the rectum area. The bleeding could be due to a colitis or inflammation of the lining of the colon, I do not think it is from hemorrhoids, if your pain persist and bleeding persist the next step for evaluation might be some blood work as well as either a CT scan or a colonoscopy. I would try a bland diet, and see how things progress.\"    Reviewed finding of anoscopy with Dr. Cassidy.   Recommends obtaining an AXR for evaluation of stool burden. Order faxed to [facility] in [place] ([phone])    Will review xray when images are pushed to us.    IF there is stool burden, Dr. Cassidy recommends golytely clean out followed by miralax maintenance.    Will watch for xray results.    Dede Mccloud RN   "

## 2023-08-23 NOTE — TELEPHONE ENCOUNTER
8/23 10:20am - Patient reports axr is scheduled for 8/23 at 3pm. Will watch for results.  Dede Mccloud RN

## 2023-08-24 NOTE — TELEPHONE ENCOUNTER
8/24: Received images through PACS. Reviewed with Dr. French (covering for Dr. Cassidy). Noted to have stool burden on outside AXR. Dr. French in agreement with Dr. Cassidy's original plan of GoLytely clean out followed by maintenance miralax.    Patient is in agreement with this plan. Would like GoLytely script sent to [facility], patient is aware that she will not receive this until at least Friday. If unable to be delivered by Friday will route prescription to local pharmacy. Patient will start with Miralax BID today while waiting for GoLytely, will resume miralax 1-2 times daily following gotlytely. Will obtain follow up AXR on Tuesday 8/29 (order faxed to [facility] [phone]).    Patient verbalized understanding and agreement in plan.  Dede Mccloud RN

## 2023-08-25 NOTE — TELEPHONE ENCOUNTER
Patient called office to report [facility] was unable to ship the golytely in time so she had the prescription transferred to local pharmacy. However local pharmacy did not have golytely in stock and wouldn't have it until Monday.    Discussed with CF RD,  Will plan Miralax clean out (full bottle of Miralax + 64oz of gatorade)   Maintenance miralax 1 capful BID following miralax cleanout  Will obtain repeat imaging on Tuesday 8/29.    Patient verbalized understanding and agreement in plan.    Dede Mccloud RN

## 2023-08-31 NOTE — TELEPHONE ENCOUNTER
"Update 8/31: patient had axr completed Wednesday 8/30, images pushed to [facility] on 8/31 and reviewed by Dr. Gallo.    Valencia reports doing the miralax clean out a little later than initially discussed as her grandpa came home on hospice and passed away last Friday 8/25. She reports doing the miralax clean out Sunday into Monday. States she feels \"ok\" she's able to eat but does report some nausea (no vomiting). Is still passing stool, however reports still passing liquid stool. No more blood in stool noted.    Discussed with Dr. Gallo  No change in stool burden  Repeat miralax clean out (called local pharmacy, golytely is not under formulary, $29 out of pocket)   Add 2 tabs of ducolax at beginning of clean out (can be repeated daily if needed)  Stay well hydrated.  Start miralax 1 capful TID starting after clean out  Repeat AXR on Tuesday 9/5.    Patient verbalized understanding and agreement in plan.    Dede Mccloud RN   "

## 2023-09-06 NOTE — TELEPHONE ENCOUNTER
Update 9/6: xray obtained 9/5 with increased stool burden.   Discussed with dr. Cassidy:  Do 4L golytely this time since miralax cleanouts did not provide relief ([facility] will ship to patient)  Add PO mucomyst 20% 30ml  QID ([facility] will ship to patient)    Obtain axr on Monday following cleanout.    Discussed with patient that if this does not provide relief, next steps would include gastograffin enema.    Dede Mccloud RN

## 2023-09-12 NOTE — TELEPHONE ENCOUNTER
Update 9/12. Patient completed 4L Golytely and mucomyst PO x3 doses on Friday 9/8. Continued miralax 2-3 times per day. AXR on 9/11. Reviewed recent AXR from 9/11 with Dr. Cassidy. Slight improvement but continued stool in right colon. Patient reports continuing to pass stool, has not become clear yet. Does not feel bloated yet today. Feels like she got more out with golytely than miralax.   Patient provided with two options:  Come in for gastrograffin enema  Continue aggressive bowel clean out at home    Patient prefers to continue at home as she doesn't have much PTO after taking time off when her grandfather passed.    At home plan:    AM (before work): 1 capful of miralax + 1 vial mucomyst (20% 30ml)  Midday (at work/lunch): 1 capful of miralax  PM (afterwork): 1 vial mucomyst (20% 30ml) + 1/2 jug (2L) of golytely.    Patient will continue this Wed 9/13 -Mon 9/18 and get a repeat AXR on Tuesday 9/19.     Patient verbalized understanding and agreement in plan.     Orders sent to [facility]: 3 jugs golytely + 6 vials of mucomyst (patient has some left over mucomyst from previous shipment)    Dede Mccloud RN

## 2023-09-13 ENCOUNTER — HOSPITAL ENCOUNTER (OUTPATIENT)
Dept: GENERAL RADIOLOGY | Facility: CLINIC | Age: 18
Discharge: HOME | End: 2023-09-13
Attending: INTERNAL MEDICINE | Admitting: INTERNAL MEDICINE
Payer: COMMERCIAL

## 2023-09-13 ENCOUNTER — TELEPHONE (OUTPATIENT)
Dept: PULMONOLOGY | Facility: CLINIC | Age: 18
End: 2023-09-13

## 2023-09-13 DIAGNOSIS — E84.9 CF (CYSTIC FIBROSIS) (H): ICD-10-CM

## 2023-09-13 DIAGNOSIS — K59.00 CONSTIPATION, UNSPECIFIED CONSTIPATION TYPE: ICD-10-CM

## 2023-09-13 DIAGNOSIS — E84.9 CF (CYSTIC FIBROSIS) (H): Primary | ICD-10-CM

## 2023-09-13 PROCEDURE — 74283 THER NMA RDCTJ INTUS/OBSTRCJ: CPT

## 2023-09-13 RX ADMIN — DIATRIZOATE MEGLUMINE AND DIATRIZOATE SODIUM 720 ML: 660; 100 SOLUTION ORAL; RECTAL at 12:30

## 2023-09-13 NOTE — TELEPHONE ENCOUNTER
Patient called to report she did her dose of mucomyst and miralax this morning and got to work and now feels nauseous. Did have a bowel movement this morning but is ready to proceed with a gastrograffin enema.    Discussed with Dr. Cassidy  Willing to order this to be done as an outpatient as patient has had recent imaging and known large stool burden.  Order placed for XR gastrograffin + 90cc 20% mucomyst     [place], [facility], and [facility] are all full, earliest opening on Friday. Patient reports she would prefer to do this today as her grandfather's  is on Friday.    [facility] and [facility] no longer do gastrograffin enemas.    Able to be scheduled at [facility] for today . Rad tech reports no mucomyst at their location, patient has home supply of mucomyst, received approval to have patient bring home supply.    Instructed patient to check in with CF office after enema is completed.    Dede Mccloud RN

## 2023-09-13 NOTE — TELEPHONE ENCOUNTER
"Update: therapeutic gastrograffin enema completed. Patient reports it \"wasn't as bad as I anticipated\". Feels like she got some stool out right after enema, but didn't get as much out as she would have anticipated.    Discussed with Dr. Cassidy  Continue with TID Miralax and BID mucomyst  AXR planned for Tuesday    Patient in agreement with plan.     Dede Mccloud RN    "

## 2023-09-18 ENCOUNTER — TELEPHONE (OUTPATIENT)
Dept: PULMONOLOGY | Facility: CLINIC | Age: 18
End: 2023-09-18
Payer: COMMERCIAL

## 2023-09-18 NOTE — TELEPHONE ENCOUNTER
"Called patient to check in on symptoms over weekend following gastrograffin enema last week. Patient reports she feels less bloated, appetite is \"ok\", having BMs but seem to be diarrhea.   When asked about miralax and mucomyst usage since gastrograffin, patient reports she didn't do either on Friday due to her grandfathers funerals. Patient then goes on to report she did not use miralax or mucomyst Saturday, Sunday or so far today (Monday). Encouraged patient to resume miralax TID and Mucomyst BID. Will order axr for tomorrow and review once images are pushed.    Patient verbalized understanding and agreement in plan.    Dede Mccloud RN   "

## 2023-09-20 NOTE — TELEPHONE ENCOUNTER
9/19 update: patient completed AXR locally today. Reviewed with Dr. Cassidy. Xray today is much improved from prior. Encouraged patient to continue with BID miralax and daily mucomyst. If she feels like she is cleaned out and bowel movements are too frequent/too loose she can back down to daily miralax.    Patient has appt scheduled with GI on 10/12.    Call back with any new or worsening symptoms.  Patient verbalized understanding and agreement in plan.    Dede Mccloud RN

## 2023-09-27 ENCOUNTER — PHARMACY VISIT (OUTPATIENT)
Dept: ADMINISTRATIVE | Facility: CLINIC | Age: 18
End: 2023-09-27
Payer: COMMERCIAL

## 2023-09-27 NOTE — PROGRESS NOTES
Cystic Fibrosis Clinical Follow Up Assessment   Completed on  21:39:00 [facility], by Yeimi Aaron        Patient  Patient Name: SURY BENDER  :   EHR ID: [id number]       Activity Date      Activity Medications    TRIKAFTA        Care Details    What are the patient's goals for this therapy?   ? 2/10/23: to maintain current health.      Did you identify any patient barriers to access and successful treatment?   ? No barriers to access identified      Is it appropriate to collect a PDC at this time? [QA Metric] (An MPR or PDC would not be appropriate for cycled medications or if the patient is on therapy   ? Yes      Document PDC   ? 0.64      Has the patient missed doses inappropriately?   ? No      Please select CURRENT side effect(s) for monitoring:   ? None          Summary Notes   Spoke with pt briefly today for TM assessment. Order for Trikafta and others set up to del 23.   Trikafta: Pt reports she is doing well. Denies SE or missed doses. Confirmed pt is taking 2 orange in the AM and 1 blue in the PM. PDC: 0.64   CF: Pt denies health, allergy or medication changes. Denies questions or concerns. Next OV scheduled for 10/12/23.   TM to continue quarterly assessments and liaisons will alert if late to fill.    Yeimi Aaron, Pharm.D.[place] [facility] Pharmacist  [facility]  [address] [place] [id number]  [email or web address]  Office: [phone]

## 2023-10-11 NOTE — PROGRESS NOTES
[facility]  October 12, 2023         Assessment and Plan:   Valencia Casiano is a 18 year old female with h/o with cystic fibrosis, exocrine pancreatic insufficiency and constipation who is seen today for routine follow up. [facility] as first seen by Dr. Cassidy back in June.     1. Cystic fibrosis: no new pulmonary complaints. Sating 100% on room air; does not neb or vest unless she is unwell. PFTs today improved to her recent best in our system. Prior cultures + for MRSA from throat cultures, MSSA, Pseudomonas fluorescens, stenotrophomonas, aspergillus (normal vipul on culture from 6/16). No acute issues.  - Nebs and vesting PRN symptoms (Tracy, RT, to see patient today)    2. CFTR modulation: F673vkg/[id number] sweat 104,105, dx age 1yo. On Trikafta and tolerating it well.   - Labs with next visit  - Continue Trikafta    3. Exocrine pancreatic insufficiency:  Abdominal pain and constipation: with repeat fecal elastase on 6/22 < 50. Denies s/s of malabsorption, BMI > CFF goal. Seen by GI today with plan to increase Miralax for a clean out if feeling constipated (please see note for up titration of care). Per notes, recurrent constipation has been worse on Trikafta, no h/o ANABELA. US pelvis and appendix on 9/25 WNL. NYA Hanson to see today.   - Continue Creon and vitamins  - Continue miralax BID  - Will have pharmacy look into coverage for Go-Lytely to have at home PRN  - Will schedule follow up with JOHN Sofia    4. ABPA: prior history, she does not recall if she was on chronic steroids. She is not currently on ICS or steroids.     5. Family Planning: per notes, no intention to get pregnant super soon. However, she would like to get rid of her IUD. She has had nexplanon and had constant bleeding, she is inconsistent with OCPs. Did not discuss today.  - Genetic referral placed by Dr. Cassidy at the last visit     RTC: in 3 months  with annuals  Annual studies due: December 2023 (including CXR, OGTT and DEXA)  Preventative care: got her flu shot today already; discussed covid booster    Roro Kirkpatrick PA-C  Pulmonary, Allergy, Critical Care and Sleep Medicine        Interval History:     Overall feeling well other than the constipation. Saw GI today and was told to increase Miralax as needed to have more consistent. Doesn't feel constipated now, had a gastrograffin enema a month ago. Has allergies, taking Zyrtec, mainly a runny or stuffy nose. No recent illnesses, no cough, no congestion or tightness, lungs feel clear. No nebs or vesting. No bloating or gas today, most days she is bloated, feels full without an appetite. Occasionally has lower abdominal pain, mainly with constipation. Has 1-2 stools per day, not fatty or floating.          Review of Systems:   Please see HPI. Otherwise, complete 10 point ROS negative.           Past Medical and Surgical History:     No past medical history on file.  No past surgical history on file.        Family History:     No family history on file.         Social History:     Social History     Socioeconomic History    Marital status: Single     Spouse name: Not on file    Number of children: Not on file    Years of education: Not on file    Highest education level: Not on file   Occupational History    Not on file   Tobacco Use    Smoking status: Unknown    Smokeless tobacco: Not on file   Substance and Sexual Activity    Alcohol use: Not on file    Drug use: Not on file    Sexual activity: Not on file   Other Topics Concern    Not on file   Social History Narrative    Not on file     Social Determinants of Health     Financial Resource Strain: Not on file   Food Insecurity: Not on file   Transportation Needs: Not on file   Physical Activity: Not on file   Stress: Not on file   Social Connections: Not on file   Interpersonal Safety: Not on file   Housing Stability: Not on file            Medications:  "    Current Outpatient Medications   Medication    vitamin D2 (ERGOCALCIFEROL) 41932 units (1250 mcg) capsule    acetylcysteine 20 % (MUCOMYST) 200 MG/ML SOLN    albuterol (PROAIR HFA/PROVENTIL HFA/VENTOLIN HFA) 108 (90 Base) MCG/ACT inhaler    albuterol (PROVENTIL) (2.5 MG/3ML) 0.083% neb solution    cetirizine (ZYRTEC) 10 MG tablet    elexacaftor-tezacaftor-ivacaftor & ivacaftor (TRIKAFTA) 100-50-75 & 150 MG tablet pack    fluticasone (FLONASE) 50 MCG/ACT nasal spray    lipase-protease-amylase (CREON) 54782-63467-062699 units CPEP per EC capsule    multivitamin (ONE-DAILY) tablet    polyethylene glycol (MIRALAX) 17 GM/Dose powder    sodium chloride inhalant 7 % NEBU neb solution     No current facility-administered medications for this visit.            Physical Exam:   /72   Pulse 74   Ht 1.505 m (4' 11.25\")   Wt 49.9 kg (110 lb 0.2 oz)   SpO2 100%   BMI 22.03 kg/m      GENERAL: alert, NAD  HEENT: NCAT, EOMI, anicteric sclera, no nasal edema or erythema; canals and TMs clear; no oral mucosal edema or erythema  Neck: no cervical or supraclavicular adenopathy  Respiratory: good air flow, mainly clear  CV: RRR, S1S2, no murmurs noted  Abdomen: normoactive BS, soft   Lymph: no edema, + digital clubbing  Neuro: AAO X 3, CN 2-12 grossly intact  Psychiatric: normal affect, good eye contact  Skin: no rash, jaundice or lesions on limited exam         Data:   All laboratory and imaging data reviewed.      Cystic Fibrosis Culture  No results found for: \"SDES\" No results found for: \"CULT\"     PFT interpretation:  Maneuver: valid and meets ATS guidelines  Normal spirometry        "

## 2023-10-12 ENCOUNTER — OFFICE VISIT (OUTPATIENT)
Dept: PULMONOLOGY | Facility: CLINIC | Age: 18
End: 2023-10-12
Attending: STUDENT IN AN ORGANIZED HEALTH CARE EDUCATION/TRAINING PROGRAM
Payer: COMMERCIAL

## 2023-10-12 ENCOUNTER — ALLIED HEALTH/NURSE VISIT (OUTPATIENT)
Dept: CARE COORDINATION | Facility: CLINIC | Age: 18
End: 2023-10-12

## 2023-10-12 VITALS
HEART RATE: 74 BPM | DIASTOLIC BLOOD PRESSURE: 72 MMHG | OXYGEN SATURATION: 100 % | BODY MASS INDEX: 22.18 KG/M2 | WEIGHT: 110.01 LBS | HEIGHT: 59 IN | SYSTOLIC BLOOD PRESSURE: 118 MMHG

## 2023-10-12 VITALS — DIASTOLIC BLOOD PRESSURE: 72 MMHG | SYSTOLIC BLOOD PRESSURE: 118 MMHG | HEART RATE: 74 BPM | OXYGEN SATURATION: 100 %

## 2023-10-12 DIAGNOSIS — R14.0 BLOATING: ICD-10-CM

## 2023-10-12 DIAGNOSIS — K86.89 PANCREATIC INSUFFICIENCY: ICD-10-CM

## 2023-10-12 DIAGNOSIS — Z23 NEED FOR VACCINATION: ICD-10-CM

## 2023-10-12 DIAGNOSIS — K62.5 RECTAL BLEEDING: ICD-10-CM

## 2023-10-12 DIAGNOSIS — K59.00 CONSTIPATION, UNSPECIFIED CONSTIPATION TYPE: Primary | ICD-10-CM

## 2023-10-12 DIAGNOSIS — E84.9 CF (CYSTIC FIBROSIS) (H): Primary | ICD-10-CM

## 2023-10-12 DIAGNOSIS — Z71.9 ENCOUNTER FOR COUNSELING: Primary | ICD-10-CM

## 2023-10-12 LAB
EXPTIME-PRE: 3.84 SEC
FEF2575-%PRED-PRE: 97 %
FEF2575-PRE: 3.28 L/SEC
FEF2575-PRED: 3.35 L/SEC
FEFMAX-%PRED-PRE: 113 %
FEFMAX-PRE: 6.98 L/SEC
FEFMAX-PRED: 6.12 L/SEC
FEV1-%PRED-PRE: 112 %
FEV1-PRE: 2.96 L
FEV1FEV6-PRE: 88 %
FEV1FEV6-PRED: 87 %
FEV1FVC-PRE: 88 %
FEV1FVC-PRED: 90 %
FIFMAX-PRE: 5.48 L/SEC
FVC-%PRED-PRE: 114 %
FVC-PRE: 3.37 L
FVC-PRED: 2.93 L

## 2023-10-12 PROCEDURE — 87070 CULTURE OTHR SPECIMN AEROBIC: CPT | Performed by: PHYSICIAN ASSISTANT

## 2023-10-12 PROCEDURE — G0463 HOSPITAL OUTPT CLINIC VISIT: HCPCS | Mod: 25 | Performed by: STUDENT IN AN ORGANIZED HEALTH CARE EDUCATION/TRAINING PROGRAM

## 2023-10-12 PROCEDURE — G0008 ADMIN INFLUENZA VIRUS VAC: HCPCS | Performed by: STUDENT IN AN ORGANIZED HEALTH CARE EDUCATION/TRAINING PROGRAM

## 2023-10-12 PROCEDURE — 90686 IIV4 VACC NO PRSV 0.5 ML IM: CPT | Performed by: STUDENT IN AN ORGANIZED HEALTH CARE EDUCATION/TRAINING PROGRAM

## 2023-10-12 PROCEDURE — 99214 OFFICE O/P EST MOD 30 MIN: CPT | Mod: 25 | Performed by: PHYSICIAN ASSISTANT

## 2023-10-12 PROCEDURE — 250N000011 HC RX IP 250 OP 636: Performed by: STUDENT IN AN ORGANIZED HEALTH CARE EDUCATION/TRAINING PROGRAM

## 2023-10-12 PROCEDURE — 94375 RESPIRATORY FLOW VOLUME LOOP: CPT | Performed by: PHYSICIAN ASSISTANT

## 2023-10-12 PROCEDURE — 97802 MEDICAL NUTRITION INDIV IN: CPT | Performed by: DIETITIAN, REGISTERED

## 2023-10-12 PROCEDURE — G0463 HOSPITAL OUTPT CLINIC VISIT: HCPCS | Mod: 25 | Performed by: PHYSICIAN ASSISTANT

## 2023-10-12 PROCEDURE — 99205 OFFICE O/P NEW HI 60 MIN: CPT | Mod: GC | Performed by: STUDENT IN AN ORGANIZED HEALTH CARE EDUCATION/TRAINING PROGRAM

## 2023-10-12 PROCEDURE — G0463 HOSPITAL OUTPT CLINIC VISIT: HCPCS | Mod: 25,27 | Performed by: STUDENT IN AN ORGANIZED HEALTH CARE EDUCATION/TRAINING PROGRAM

## 2023-10-12 RX ORDER — MULTIVITAMIN
1 TABLET ORAL DAILY
Qty: 100 TABLET | Refills: 3 | Status: SHIPPED | OUTPATIENT
Start: 2023-10-12 | End: 2024-07-15

## 2023-10-12 RX ORDER — ERGOCALCIFEROL 1.25 MG/1
50000 CAPSULE, LIQUID FILLED ORAL WEEKLY
Qty: 52 CAPSULE | Refills: 0 | Status: SHIPPED | OUTPATIENT
Start: 2023-10-12 | End: 2024-01-18

## 2023-10-12 RX ADMIN — INFLUENZA A VIRUS A/VICTORIA/4897/2022 IVR-238 (H1N1) ANTIGEN (FORMALDEHYDE INACTIVATED), INFLUENZA A VIRUS A/DARWIN/9/2021 SAN-010 (H3N2) ANTIGEN (FORMALDEHYDE INACTIVATED), INFLUENZA B VIRUS B/PHUKET/3073/2013 ANTIGEN (FORMALDEHYDE INACTIVATED), AND INFLUENZA B VIRUS B/MICHIGAN/01/2021 ANTIGEN (FORMALDEHYDE INACTIVATED) 0.5 ML: 15; 15; 15; 15 INJECTION, SUSPENSION INTRAMUSCULAR at 10:27

## 2023-10-12 ASSESSMENT — PAIN SCALES - GENERAL: PAINLEVEL: NO PAIN (0)

## 2023-10-12 NOTE — PROGRESS NOTES
"CF Annual Nutrition Assessment    Reason for Assessment  Assessed during [facility] PA-C Clinic r/t increased nutrition risk with diagnosis of CF per protocol.   - New transition from [facility]. First annual visit completed with [facility] RD.   - Pt accompanied by boyfriend, Jesus. Also had visit with GI today.     Nutrition Significant PMH  Mild Lung Disease - taking Trikafta  Pancreatic Insufficient  Hx insulin with steroids per chart    Anthropometric Assessment  Height: 150.5 cm  Weight: 49.9 kg (110 lbs)  IBW based on BMI 22 for females and 23 for males per [facility] recs: 49.8 kg (110 lbs)  Body mass index is 22.03 kg/m .     History of difficulty gaining weight in childhood. At goal BMI per CFF. No concerns today/stable.     Pancreatic Enzymes  Repeat fecal elastase <50 on 23 at local clinic - confirms severe EPI.   Brand:  Creon [id number]  Dosin with meals and snacks = 1920 units lipase/kg/meal  Estimated Daily Intake: 16/day  *may skip enzymes with snacks    GI Comments: chronic constipation, bloating, and abdominal pain. Worsened with Trikafta. Recently required outpatient Miralax/Golytely clean-outs for stool burden on AXR without success, needed gastrograffin enema.    -- Now taking Miralax 1-2 caps/day. Seen by CF GI today.     Enzyme Program: not eligible     Diet History and Assessment  Diet Preferences/Allergies/Intolerances: Regular  Intake Recall/Comments: Says she does more snacks than big meals. Skips breakfast. For lunch goes to a friend's house and has freezer/convenience foods like pizza rolls, cheese curds or leftovers. Dinner is \"a lot of meat\" with sides like rice/veggies when boyfriend is home and cooks. He travels 1/2 each week for work so she may have cereal with 2% milk when on her own. Snacks are \"anything\"  - chips, granola bars, crackers, Balanced Breaks packs, cheese, etc.     Calcium: lots of cheese, milk with cereal  Salt: likely adequate " from foods  Hydration: water, apple juice, some milk  Supplements:  none    Estimated Energy and Protein Needs  BEE: 1320  5098-1490 kcals/day = 150-175% BEE  60-75 g protein/day = 1.2-1.5 g/kg    Laboratory Assessment  Plan to obtain updated annual study labs next visit.     Current Vitamin/Mineral Prescription: Vitamin D2 50,000 units weekly    Nutrition Diagnosis  Impaired nutrient utilization related pancreatic insufficiency with CF as evidenced by requires high kcal/pro diet, vitamin/mineral supplementation, and pancreatic enzyme replacement in order to maintain nutrition and health status.     Interventions/Recommendations  1) PO Intake/Weight: spent majority of visit obtaining nutrition history as noted above. Discussed current nutrition status and goals with Valencia.     2) Vitamin/Mineral Supplementation: reviewed at-risk for certain micronutrient deficiencies and educated on importance of calcium (bone health), folic acid (family planning), etc. Recommend start general MVI as she is only taking Vit D. Ordered prescription to local pharmacy per pt request.     3) GI/Enzymes: following with CF GI for symptoms chronic bloating, constipation. Note that pt taking same enzyme dose for all food intake. Discussed titrating enzymes based on size/content of meal. Also discussed calling CF office if no BM >1 day for additional management.     GOALS:  1) Maintain BMI >22 kg/m2  2) Vitamin levels wnl  3) Take enzymes with all meals/snacks    FOLLOW-UP/MONITORING:  Visit patient within 6-12 month(s) or sooner per pt/MD request; closer follow-up with recent transition to adult clinic.     Time Spent In Face-to-Face Patient Interactions: 15 minutes    Margie Smith RD, EWA, CACFD  Cystic Fibrosis/Lung Transplant Dietitian  Pager [phone]

## 2023-10-12 NOTE — PROGRESS NOTES
GI CLINIC VISIT    CC/REFERRING MD:  Charley Cassidy  REASON FOR CONSULTATION:   Charley Cassidy for constipation  Chief Complaint   Patient presents with    Cystic Fibrosis     CF follow up        ASSESSMENT/PLAN:    Chronic Idiopathic Constipation   Hx of Cystic Fibrosis     Patient with a history of CF who presents to us with concern for constipation, patient states a few months ago she was many times going a whole week without a bowel movement and this led to abdominal discomfort and bloating. Sh was eventually started on Miralax which did not seem to improve her symptoms, this prompted a gastrografin enema about a month ago, this worked well and she has since been maintained on Miralax.     She currently takes 1-2 scoops of Miralax daily and has been having 1-2 BMs daily for the most part. She reports improvement in her abdominal discomfort and bloating. She denies blood in stool or weight loss     She feels very well with her current Miralax regimen and daily BMs and has no new GI related concerns today.     Constipation is likely secondary to slow colonic transit as a result of her history of cystic fibrosis     Given that patient is doing very well on her current Miralax regimen and is having at least 1 -2 BMs daily, we will continue current regimen without changes today, I also advised that she can titrate her Miralax as needed, Ideally we will not want her to have more than 3 BMs daily.     If she unfortunately looses response to Miralax, we can consider other options like linaclotide, lubipristone etc in the future. Patient was in agreement with this plan.     Plan:   --Continue Miralax, recommend self titration as needed ( discussed with the patient)   --If evidence of blood in stool despite more regular BMs, will consider colonoscopy given higher risk of colon cancer in CF patients.   --Patient reports occasionally  getting some effect from taking castor oil, we advised that it was okay for her to take  as needed but will recmmend optimizing Miralax as discussed above.   --Patient with recently normal basic labs, will check TSH at her next lab draw.   --If miralax fails, would recommend starting linaclotide 145 mcg  --If linaclotide cannot be covered, would recommend lubiprostone 16 mcg  --miralax can be used alongside these medications.      Follow up in 6 months, sooner if needed.     Plan discussed with GI Staff Dr. Pagan and Dr Saldivar who are in agreement.     60 minutes spent on the date of the encounter performing chart review, history and exam, documentation and further activities as noted above.      Daniel Dickens MD  Gastroenterology Fellow  Division of Gastroenterology, Hepatology and Nutrition  [facility]  P: [phone]        HPI  18 year old patient with a history of CF who presents to us with concern for constipation, patient states a few months ago she was many times going a whole week without a bowel movement and this led to abdominal discomfort and bloating. Sh was eventually started on Miralax which did not seem to improve her symptoms, this prompted a gastrografin enema about a month ago, this worked well and she has since been maintained on Miralax.     Prior to her gastrografin enema, she reports hard stools and having to strain and also reports occasional blood in stool at that time. But she has not had anymore blood in stool since her gastrografin enema and has not had to strain as much as she used to    She currently takes 1-2 scoops of Miralax daily and has been having 1-2 BMs daily for the most part. She reports improvement in her abdominal discomfort and bloating. She denies blood in stool or weight loss     She feels very well with her current Miralax regimen and daily BMs and has no new GI related concerns today.       ROS:    Per HPI    PROBLEM LIST  There are no problems to display for this patient.      PERTINENT PAST MEDICAL HISTORY:  Cystic fibrosis      PREVIOUS SURGERIES:  No past surgical history on file.    PREVIOUS ENDOSCOPY:  None    ALLERGIES:     Allergies   Allergen Reactions    Vancomycin Hives     Get's shoshana syndrome and itching: She tolerates it with slowing the infusion and premed with benadryl    Latex Itching     Latex tape- breaks out in hives        PERTINENT MEDICATIONS:    Current Outpatient Medications:     acetylcysteine 20 % (MUCOMYST) 200 MG/ML SOLN, Take 30 mLs by mouth 4 times daily, Disp: 240 mL, Rfl: 0    albuterol (PROAIR HFA/PROVENTIL HFA/VENTOLIN HFA) 108 (90 Base) MCG/ACT inhaler, Inhale 1-2 puffs into the lungs every 4 hours as needed for shortness of breath or wheezing, Disp: 18 g, Rfl: 3    albuterol (PROVENTIL) (2.5 MG/3ML) 0.083% neb solution, Take 1 vial (2.5 mg) by nebulization 4 times daily as needed for exacerbation, Disp: 270 mL, Rfl: 3    cetirizine (ZYRTEC) 10 MG tablet, Take 1 tablet (10 mg) by mouth daily, Disp: 90 tablet, Rfl: 3    elexacaftor-tezacaftor-ivacaftor & ivacaftor (TRIKAFTA) 100-50-75 & 150 MG tablet pack, Take 2 orange tablets in the morning and 1 light blue tablet in the evening. Swallow whole with fat-containing food., Disp: 84 tablet, Rfl: 5    fluticasone (FLONASE) 50 MCG/ACT nasal spray, Spray 2 sprays into both nostrils daily as needed for rhinitis or allergies, Disp: 16 g, Rfl: 11    lipase-protease-amylase (CREON) 13534-53819-219926 units CPEP per EC capsule, Take 4 capsules by mouth 3 times daily (with meals) and 4 capsules with snacks 3 times daily. 3 snacks/3 meals per day., Disp: 720 capsule, Rfl: 11    polyethylene glycol (MIRALAX) 17 GM/Dose powder, Take 17 g by mouth daily, Disp: 510 g, Rfl: 11    sodium chloride inhalant 7 % NEBU neb solution, Take 4 mLs by nebulization up to four times daily for exacerbations., Disp: 720 mL, Rfl: 3    vitamin D2 (ERGOCALCIFEROL) 20702 units (1250 mcg) capsule, Take 1 capsule (50,000 Units) by mouth once a week, Disp: 52 capsule, Rfl: 0  No current  facility-administered medications for this visit.    SOCIAL HISTORY:  Social History     Socioeconomic History    Marital status: Single     Spouse name: Not on file    Number of children: Not on file    Years of education: Not on file    Highest education level: Not on file   Occupational History    Not on file   Tobacco Use    Smoking status: Unknown    Smokeless tobacco: Not on file   Substance and Sexual Activity    Alcohol use: Not on file    Drug use: Not on file    Sexual activity: Not on file   Other Topics Concern    Not on file   Social History Narrative    Not on file     Social Determinants of Health     Financial Resource Strain: Not on file   Food Insecurity: Not on file   Transportation Needs: Not on file   Physical Activity: Not on file   Stress: Not on file   Social Connections: Not on file   Interpersonal Safety: Not on file   Housing Stability: Not on file       FAMILY HISTORY:  No family history on file.    Past/family/social history reviewed and no changes    PHYSICAL EXAMINATION:  Constitutional: aaox3, cooperative, pleasant, not dyspneic/diaphoretic, no acute distress  Vitals reviewed: /72   Pulse 74   SpO2 100%   Wt:   Wt Readings from Last 2 Encounters:   10/12/23 49.9 kg (110 lb 0.2 oz)      Eyes: Sclera anicteric/injected  Ears/nose/mouth/throat: Normal oropharynx without ulcers or exudate, mucus membranes moist, hearing intact  Neck: supple  CV: No edema  Respiratory: Unlabored breathing  Abd:  Nondistended, +bs, no hepatosplenomegaly, nontender, no peritoneal signs  Skin: warm, perfused, no jaundice  Psych: Normal affect  MSK: Normal gait      PERTINENT STUDIES:    External Order Results on 06/22/2023   Component Date Value Ref Range Status    Scan Lab Results (External) 06/22/2023 See Scanned Report (L)   Final

## 2023-10-12 NOTE — NURSING NOTE
Chief Complaint   Patient presents with    Cystic Fibrosis     CF follow up      Vitals were taken and medications were reconciled.     Emmy PRIETO  10:04 AM

## 2023-10-12 NOTE — NURSING NOTE
"Valencia Casiano is a 18 year old year old who is being seen for Cystic Fibrosis (CF Follow up )      Medications reviewed and Vital signs taken.    Specimen Collection Type: Throat Swab    Order(s) placed: CF Aerobic Bacterial    *IF AFB order placed - please enter \"PRIORITIZE AFB\" to order comments.       No results found for: \"ACIDFAST\"      No results found for: \"AFBSMS\"    Vitals were taken and medications were reconciled.     Emmy Oviedo RMA  10:55 AM      "

## 2023-10-12 NOTE — CONFIDENTIAL NOTE
Respiratory Therapist Note:         Vest                Brand: Hill-Rom - traditional Hill Rom: Frequencies 8, 9, 10 at pressure 10 then frequencies 18, 19, 20 at pressure 6.                Cough Pause: Cough Pause; Yes                Vest Garment Size: Adult Small                Last Fitting Date: due                Frequency of therapy: 0 times per week                Concerns:          Exercise (purposeful and aerobic for >20 minutes each session): NO. Active job, up and about all day, not purposeful exercise                Does this qualify as additional airway clearance: No         Alternative Airway Clearance:        Nebulized Medications                Bronchodilators: Albuterol                Mucolytic: Pulmozyme, saline (unsure of strength)                Antibiotics: none                Additional Inhaled Medications: MDI (Ventolin prn)         Review Cleaning: Yes. Soap and boil.         Education and Transition Information                Correct order of inhaled medications:                 Mechanism of Action of inhaled medications:                 Frequency of inhaled medications:                 Dosage of inhaled medications:                 Other:          Home Care:       Oxygen: none      Pulmonary Rehab: none       Plan of Care and Goals for next visit:

## 2023-10-12 NOTE — LETTER
10/12/2023         RE: Valencia Casiano  [address]  [place] [address]        Dear Colleague,    Thank you for referring your patient, Valencia Casiano, to the [facility] [place]. Please see a copy of my visit note below.    [facility]  October 12, 2023         Assessment and Plan:   Valencia Casiano is a 18 year old female with h/o with cystic fibrosis, exocrine pancreatic insufficiency and constipation who is seen today for routine follow up. She recently transitioned from [facility] and as first seen by Dr. Cassidy back in June.     1. Cystic fibrosis: no new pulmonary complaints. Sating 100% on room air; does not neb or vest unless she is unwell. PFTs today improved to her recent best in our system. Prior cultures + for MRSA from throat cultures, MSSA, Pseudomonas fluorescens, stenotrophomonas, aspergillus (normal vipul on culture from 6/16). No acute issues.  - Nebs and vesting PRN symptoms (Tracy RT, to see patient today)    2. CFTR modulation: V154flo/O920bie sweat 104,105, dx age 1yo. On Trikafta and tolerating it well.   - Labs with next visit  - Continue Trikafta    3. Exocrine pancreatic insufficiency:  Abdominal pain and constipation: with repeat fecal elastase on 6/22 < 50. Denies s/s of malabsorption, BMI > CFF goal. Seen by GI today with plan to increase Miralax for a clean out if feeling constipated (please see note for up titration of care). Per notes, recurrent constipation has been worse on Trikafta, no h/o ANABELA. US pelvis and appendix on 9/25 WNL. NYA Hanson to see today.   - Continue Creon and vitamins  - Continue miralax BID  - Will have pharmacy look into coverage for Go-Lytely to have at home PRN  - Will schedule follow up with JOHN Sofia    4. ABPA: prior history, she does not recall if she was on chronic steroids. She is not currently on ICS or steroids.     5. Family  Planning: per notes, no intention to get pregnant super soon. However, she would like to get rid of her IUD. She has had nexplanon and had constant bleeding, she is inconsistent with OCPs. Did not discuss today.  - Genetic referral placed by Dr. Cassidy at the last visit     RTC: in 3 months with annuals  Annual studies due: December 2023 (including CXR, OGTT and DEXA)  Preventative care: got her flu shot today already; discussed covid booster    Roro Kirkpatrick PA-C  Pulmonary, Allergy, Critical Care and Sleep Medicine        Interval History:     Overall feeling well other than the constipation. Saw GI today and was told to increase Miralax as needed to have more consistent. Doesn't feel constipated now, had a gastrograffin enema a month ago. Has allergies, taking Zyrtec, mainly a runny or stuffy nose. No recent illnesses, no cough, no congestion or tightness, lungs feel clear. No nebs or vesting. No bloating or gas today, most days she is bloated, feels full without an appetite. Occasionally has lower abdominal pain, mainly with constipation. Has 1-2 stools per day, not fatty or floating.          Review of Systems:   Please see HPI. Otherwise, complete 10 point ROS negative.           Past Medical and Surgical History:     No past medical history on file.  No past surgical history on file.        Family History:     No family history on file.         Social History:     Social History     Socioeconomic History    Marital status: Single     Spouse name: Not on file    Number of children: Not on file    Years of education: Not on file    Highest education level: Not on file   Occupational History    Not on file   Tobacco Use    Smoking status: Unknown    Smokeless tobacco: Not on file   Substance and Sexual Activity    Alcohol use: Not on file    Drug use: Not on file    Sexual activity: Not on file   Other Topics Concern    Not on file   Social History Narrative    Not on file     Social Determinants of Health  "    Financial Resource Strain: Not on file   Food Insecurity: Not on file   Transportation Needs: Not on file   Physical Activity: Not on file   Stress: Not on file   Social Connections: Not on file   Interpersonal Safety: Not on file   Housing Stability: Not on file            Medications:     Current Outpatient Medications   Medication    vitamin D2 (ERGOCALCIFEROL) 50129 units (1250 mcg) capsule    acetylcysteine 20 % (MUCOMYST) 200 MG/ML SOLN    albuterol (PROAIR HFA/PROVENTIL HFA/VENTOLIN HFA) 108 (90 Base) MCG/ACT inhaler    albuterol (PROVENTIL) (2.5 MG/3ML) 0.083% neb solution    cetirizine (ZYRTEC) 10 MG tablet    elexacaftor-tezacaftor-ivacaftor & ivacaftor (TRIKAFTA) 100-50-75 & 150 MG tablet pack    fluticasone (FLONASE) 50 MCG/ACT nasal spray    lipase-protease-amylase (CREON) 63439-11251-751451 units CPEP per EC capsule    multivitamin (ONE-DAILY) tablet    polyethylene glycol (MIRALAX) 17 GM/Dose powder    sodium chloride inhalant 7 % NEBU neb solution     No current facility-administered medications for this visit.            Physical Exam:   /72   Pulse 74   Ht 1.505 m (4' 11.25\")   Wt 49.9 kg (110 lb 0.2 oz)   SpO2 100%   BMI 22.03 kg/m      GENERAL: alert, NAD  HEENT: NCAT, EOMI, anicteric sclera, no nasal edema or erythema; canals and TMs clear; no oral mucosal edema or erythema  Neck: no cervical or supraclavicular adenopathy  Respiratory: good air flow, mainly clear  CV: RRR, S1S2, no murmurs noted  Abdomen: normoactive BS, soft   Lymph: no edema, + digital clubbing  Neuro: AAO X 3, CN 2-12 grossly intact  Psychiatric: normal affect, good eye contact  Skin: no rash, jaundice or lesions on limited exam         Data:   All laboratory and imaging data reviewed.      Cystic Fibrosis Culture  No results found for: \"SDES\" No results found for: \"CULT\"     PFT interpretation:  Maneuver: valid and meets ATS guidelines  Normal spirometry          CF Annual Nutrition Assessment    Reason for " "Assessment  Assessed during Roro Kirkpatrick PA-C Clinic r/t increased nutrition risk with diagnosis of CF per protocol.   - New transition from [facility]. First annual visit completed with [facility] RD.   - Pt accompanied by boyfriend, Jesus. Also had visit with GI today.     Nutrition Significant PMH  Mild Lung Disease - taking Trikafta  Pancreatic Insufficient  Hx insulin with steroids per chart    Anthropometric Assessment  Height: 150.5 cm  Weight: 49.9 kg (110 lbs)  IBW based on BMI 22 for females and 23 for males per [facility] Foundation recs: 49.8 kg (110 lbs)  Body mass index is 22.03 kg/m .     History of difficulty gaining weight in childhood. At goal BMI per CFF. No concerns today/stable.     Pancreatic Enzymes  Repeat fecal elastase <50 on 23 at local clinic - confirms severe EPI.   Brand:  Creon [id number]  Dosin with meals and snacks = 1920 units lipase/kg/meal  Estimated Daily Intake: 16/day  *may skip enzymes with snacks    GI Comments: chronic constipation, bloating, and abdominal pain. Worsened with Trikafta. Recently required outpatient Miralax/Golytely clean-outs for stool burden on AXR without success, needed gastrograffin enema.    -- Now taking Miralax 1-2 caps/day. Seen by CF GI today.     Enzyme Program: not eligible     Diet History and Assessment  Diet Preferences/Allergies/Intolerances: Regular  Intake Recall/Comments: Says she does more snacks than big meals. Skips breakfast. For lunch goes to a friend's house and has freezer/convenience foods like pizza rolls, cheese curds or leftovers. Dinner is \"a lot of meat\" with sides like rice/veggies when boyfriend is home and cooks. He travels 1/2 each week for work so she may have cereal with 2% milk when on her own. Snacks are \"anything\"  - chips, granola bars, crackers, Balanced Breaks packs, cheese, etc.     Calcium: lots of cheese, milk with cereal  Salt: likely adequate from foods  Hydration: water, apple juice, some " milk  Supplements:  none    Estimated Energy and Protein Needs  BEE: 1320  1288-8123 kcals/day = 150-175% BEE  60-75 g protein/day = 1.2-1.5 g/kg    Laboratory Assessment  Plan to obtain updated annual study labs next visit.     Current Vitamin/Mineral Prescription: Vitamin D2 50,000 units weekly    Nutrition Diagnosis  Impaired nutrient utilization related pancreatic insufficiency with CF as evidenced by requires high kcal/pro diet, vitamin/mineral supplementation, and pancreatic enzyme replacement in order to maintain nutrition and health status.     Interventions/Recommendations  1) PO Intake/Weight: spent majority of visit obtaining nutrition history as noted above. Discussed current nutrition status and goals with Valencia.     2) Vitamin/Mineral Supplementation: reviewed at-risk for certain micronutrient deficiencies and educated on importance of calcium (bone health), folic acid (family planning), etc. Recommend start general MVI as she is only taking Vit D. Ordered prescription to local pharmacy per pt request.     3) GI/Enzymes: following with CF GI for symptoms chronic bloating, constipation. Note that pt taking same enzyme dose for all food intake. Discussed titrating enzymes based on size/content of meal. Also discussed calling CF office if no BM >1 day for additional management.     GOALS:  1) Maintain BMI >22 kg/m2  2) Vitamin levels wnl  3) Take enzymes with all meals/snacks    FOLLOW-UP/MONITORING:  Visit patient within 6-12 month(s) or sooner per pt/MD request; closer follow-up with recent transition to adult clinic.     Time Spent In Face-to-Face Patient Interactions: 15 minutes    Margie Smith RD, LD, CACFD  Cystic Fibrosis/Lung Transplant Dietitian  Pager [phone]      Roro Kirkpatrick PA-C

## 2023-10-12 NOTE — PROGRESS NOTES
Adult Cystic Fibrosis Program  Social Work Clinic Consult    Data:   Valencia recently transitioned to the [facility] from [facility]. Met with Valencia to introduce self as CF SW and to review psychosocial needs and provide counseling as needed. Valencia's boyfriend, Jesus was also present.     Valencia reports she is doing well. She feels like the transition to the adult program is going well though notes the CSC and parking situation is a bit overwhelming. Reviewed parking options. Explained SW role and frequency of visits. Valencia worked with the pediatric SW at [facility] so is aware of SW role. She denied having any needs to address today. 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 She lives with her boyfriend in [place]. He works in [employer]. She was agreeable to completing annual visit at her next appointment. She is aware of how to get a hold of SW if needs arise in the meantime.     Intervention:  -Introduction to SW and SW role  -Brief needs assessment    Assessment: Valencia was friendly and receptive to SW visit. She denied having any concerns today and feels the transition is going well to the adult program.    Plan:   -Continue to follow through regular clinic consult.  -Continue to follow for any psychosocial needs that may arise.  -Complete full psychosocial assessment annually.     Amy Heller, [facility]  Adult Cystic Fibrosis   Ph: [phone], Pager: [phone]

## 2023-10-17 LAB — BACTERIA SPEC CULT: NORMAL

## 2023-10-18 DIAGNOSIS — E84.9 CF (CYSTIC FIBROSIS) (H): ICD-10-CM

## 2023-10-18 DIAGNOSIS — K59.00 CONSTIPATION, UNSPECIFIED CONSTIPATION TYPE: ICD-10-CM

## 2023-11-13 ENCOUNTER — TELEPHONE (OUTPATIENT)
Dept: PULMONOLOGY | Facility: CLINIC | Age: 18
End: 2023-11-13
Payer: COMMERCIAL

## 2023-11-13 NOTE — TELEPHONE ENCOUNTER
Patient Contacted for the patient to call back and schedule the following:    Appointment type: RCF  Provider: AXEL  Return date: 12/14/23  Specialty phone number: [phone]  Additional appointment(s) needed: LABS,PFT,CXR  Additonal Notes: N/A

## 2023-12-01 DIAGNOSIS — E84.9 CF (CYSTIC FIBROSIS) (H): Primary | ICD-10-CM

## 2023-12-14 ENCOUNTER — TELEPHONE (OUTPATIENT)
Dept: CARE COORDINATION | Facility: CLINIC | Age: 18
End: 2023-12-14

## 2023-12-21 ENCOUNTER — TELEPHONE (OUTPATIENT)
Dept: PULMONOLOGY | Facility: CLINIC | Age: 18
End: 2023-12-21
Payer: COMMERCIAL

## 2023-12-21 NOTE — TELEPHONE ENCOUNTER
[facility] Prior Authorization Team   Phone: [phone]  Fax: [phone]    PA Initiation    Medication: TRIKAFTA 100-50-75 & 150 MG PO TBPK  Insurance Company: [employer] - Phone [phone] Fax [phone]  Pharmacy Filling the Rx: [facility] - [place], [place] - [address]  Filling Pharmacy Phone: [phone]  Filling Pharmacy Fax: [phone]  Start Date: 12/21/2023

## 2023-12-22 NOTE — TELEPHONE ENCOUNTER
Prior Authorization Approval    Medication: TRIKAFTA 100-50-75 & 150 MG PO TBPK  Authorization Effective Date: 11/21/2023  Authorization Expiration Date: 12/20/2024  Approved Dose/Quantity: 84 per 28 days  Reference #: [id number]   Insurance Company: [employer] - Phone [phone] Fax [phone]  Expected CoPay: $ 3,400  CoPay Card Available: No    Financial Assistance Needed: Yes  Which Pharmacy is filling the prescription: [facility] - [place], [place] - [address]  Pharmacy Notified: Yes  Patient Notified: Yes

## 2023-12-26 NOTE — TELEPHONE ENCOUNTER
Adult Cystic Fibrosis Program  Social Work Phone/E-mail Communication    Data:   Valencia reached out to  as she was over income for medical assistance and was kicked off earlier this month. She was wondering what options she had as she has heard that her employer group coverage was not adequate. Discussed [employer] marketplace but that she should consider her employer group plan as marketplace plans will likely be more expensive. Valencia sent  her detailed EOB and SW answered questions regarding the plan. Valencia denied having any additional questions for SW and plans to enroll in her employer plan ([employer]) as it does appear to be adequate coverage.     Valencia denied having additional questions/concerns for SW at this time. Verified she has CF SW contact info and encouraged her to contact  at anytime.    Intervention:   -Insurance/financial counseling    Assessment: Valencia was friendly and receptive to  assistance with figuring out insurance. She is now over income for MA but plans to enroll in her employer group plan. She has enough medication to last her until her employer coverage becomes active.    Plan:   Continue to assist with insurance concern(s)  Continue to follow through regular clinic consult and annual visit    DIMPLE Dickson  Adult Cystic Fibrosis   Ph: [phone], Pager: [phone]

## 2024-01-03 ENCOUNTER — TELEPHONE (OUTPATIENT)
Dept: PHARMACY | Facility: CLINIC | Age: 19
End: 2024-01-03
Payer: COMMERCIAL

## 2024-01-03 ENCOUNTER — TELEPHONE (OUTPATIENT)
Dept: PULMONOLOGY | Facility: CLINIC | Age: 19
End: 2024-01-03
Payer: COMMERCIAL

## 2024-01-03 NOTE — TELEPHONE ENCOUNTER
Spoke to Valencia and offered her information on The Assistance Fund  yuriy. She wants to review and will reach out if any questions.     [email or web address]

## 2024-01-03 NOTE — TELEPHONE ENCOUNTER
[facility] enrollment    Message from [facility], patient was interested in copay card.     Spoke to Valencia and she would like to enroll in [employer]. Let her know she will receive an email to consent to enroll and [employer] would send pharmacy her copay card information.

## 2024-01-17 NOTE — PROGRESS NOTES
[facility]  January 18, 2024         Assessment and Plan:   Valencia Casiano is a 18 year old female with h/o with cystic fibrosis, exocrine pancreatic insufficiency and constipation who is seen today for routine follow up. [facility]     1. Cystic fibrosis: no new pulmonary complaints, was sick Thanksgiving through [place], but feels she has gotten over that illness. Sating 100% on room air; does not neb or vest unless she is unwell. PFTs today are stable at her baseline. Prior cultures + for MRSA from throat cultures, MSSA, Pseudomonas fluorescens, stenotrophomonas, aspergillus (normal vipul on culture from 6/16). No acute issues.  - Nebs and vesting PRN symptoms      2. CFTR modulation: [id number], on Trikafta and tolerating it well. Bilirubin in Nov of 1.4, up to 1.6 today. Also with elevated INR. Suspect could be related to multiple supplements she bought online, Charley to see today, will plan to hold supplements.   - Bilirubin and INR in 2-3 weeks  - Continue Trikafta     3. Exocrine pancreatic insufficiency:  Abdominal pain and constipation: with repeat fecal elastase on 6/22 < 50. Denies s/s of malabsorption. Ongoing varialbe stools with some blood per patient, predominantly constipation. Notes she did not tolerate drinking oral mucomyst and Miralax is not helpful. Reviewed GI notes with the patient (from 101/16) and patient states she was unaware of any of the recommendations. Given 3 months since visit, will have her see CHRIS Sofia, asap.   - Continue Creon and vitamins  - Per notes, should start linaclotide 145 mcg and undergo colonoscopy given on going blood, but will defer to GI    4. ABPA: prior history, she does not recall if she was on steroids. She is not currently on ICS or steroids.   - IgE is pending     5. Family planning: per notes, would like to get rid of her IUD and has not plan for other forms  of contraception. Talking with genetics, Sherry, at this time. Would like to discuss pregnancy with MFM.  - Referral placed     RTC: in 3 months   Annual studies due: January 2024; CXR today, need to schedule OGTT   Preventative care: flu shot completed; discussed covid booster     Roro Kirkpatrick PA-C  Pulmonary, Allergy, Critical Care and Sleep Medicine        Interval History:     Insurance ended on 12/1, but back on track. Overall, feeling okay. [place] sick [place], all in her face. Thinks she had covid, but never tested. No fever or chills, no sinus or nasal congestion, minimal drippy nose, no PND. Minimal cough, does have some mild shortness of breath with cleaning. Did a neb and vest x 1 when she felt sick. Feels like there is good days and bad days with her GI issues: stools will alternate between being hard and diarrhea. Constipation is present a lot of the time, diarrhea is less frequently. Started taking supplements from [employer] a month ago. Still having bloating, but no pain. Feels her GI symptoms are slightly better from Oct.          Review of Systems:   Please see HPI. Otherwise, complete 10 point ROS negative.           Past Medical and Surgical History:     No past medical history on file.  No past surgical history on file.        Family History:     No family history on file.         Social History:     Social History     Socioeconomic History    Marital status: Single     Spouse name: Not on file    Number of children: Not on file    Years of education: Not on file    Highest education level: Not on file   Occupational History    Not on file   Tobacco Use    Smoking status: Unknown    Smokeless tobacco: Not on file   Substance and Sexual Activity    Alcohol use: Not on file    Drug use: Not on file    Sexual activity: Not on file   Other Topics Concern    Not on file   Social History Narrative    Not on file     Social Determinants of Health     Financial Resource Strain: Not on  "file   Food Insecurity: Not on file   Transportation Needs: Not on file   Physical Activity: Not on file   Stress: Not on file   Social Connections: Not on file   Interpersonal Safety: Not on file   Housing Stability: Not on file            Medications:     Current Outpatient Medications   Medication    elexacaftor-tezacaftor-ivacaftor & ivacaftor (TRIKAFTA) 100-50-75 & 150 MG tablet pack    polyethylene glycol (MIRALAX) 17 GM/Dose powder    albuterol (PROAIR HFA/PROVENTIL HFA/VENTOLIN HFA) 108 (90 Base) MCG/ACT inhaler    albuterol (PROVENTIL) (2.5 MG/3ML) 0.083% neb solution    cetirizine (ZYRTEC) 10 MG tablet    fluticasone (FLONASE) 50 MCG/ACT nasal spray    lipase-protease-amylase (CREON) 27591-14022-588942 units CPEP per EC capsule    Melatonin 10 MG TABS tablet    multivitamin (ONE-DAILY) tablet    sodium chloride inhalant 7 % NEBU neb solution     No current facility-administered medications for this visit.            Physical Exam:   /62 (BP Location: Right arm)   Pulse 78   SpO2 100%     GENERAL: alert, NAD  HEENT: NCAT, EOMI, anicteric sclera, no nasal edema or erythema; canals and TMs clear; no oral mucosal edema or erythema  Neck: no cervical or supraclavicular adenopathy  Respiratory: good air flow, no crackles, rhonchi or wheezing  CV: RRR, S1S2, no murmurs noted  Abdomen: normoactive BS, soft, mild epigastric tenderness  Lymph: no edema, + digital clubbing  Neuro: AAO X 3, CN 2-12 grossly intact  Psychiatric: normal affect, good eye contact  Skin: no rash, jaundice or lesions on limited exam         Data:   All laboratory and imaging data reviewed.      Cystic Fibrosis Culture  No results found for: \"SDES\" No results found for: \"CULT\"     PFT interpretation:  Maneuver: valid and meets ATS guidelines  Normal spirometry        "

## 2024-01-18 ENCOUNTER — OFFICE VISIT (OUTPATIENT)
Dept: PULMONOLOGY | Facility: CLINIC | Age: 19
End: 2024-01-18
Attending: PHYSICIAN ASSISTANT
Payer: COMMERCIAL

## 2024-01-18 ENCOUNTER — OFFICE VISIT (OUTPATIENT)
Dept: PHARMACY | Facility: CLINIC | Age: 19
End: 2024-01-18
Payer: COMMERCIAL

## 2024-01-18 ENCOUNTER — LAB (OUTPATIENT)
Dept: LAB | Facility: CLINIC | Age: 19
End: 2024-01-18
Attending: PHYSICIAN ASSISTANT
Payer: COMMERCIAL

## 2024-01-18 ENCOUNTER — ANCILLARY PROCEDURE (OUTPATIENT)
Dept: BONE DENSITY | Facility: CLINIC | Age: 19
End: 2024-01-18
Attending: PHYSICIAN ASSISTANT
Payer: COMMERCIAL

## 2024-01-18 VITALS — HEART RATE: 78 BPM | DIASTOLIC BLOOD PRESSURE: 62 MMHG | OXYGEN SATURATION: 100 % | SYSTOLIC BLOOD PRESSURE: 106 MMHG

## 2024-01-18 DIAGNOSIS — E84.9 CF (CYSTIC FIBROSIS) (H): ICD-10-CM

## 2024-01-18 DIAGNOSIS — K86.89 PANCREATIC INSUFFICIENCY: ICD-10-CM

## 2024-01-18 DIAGNOSIS — E84.9 CYSTIC FIBROSIS (H): Primary | ICD-10-CM

## 2024-01-18 DIAGNOSIS — Z31.69 ENCOUNTER FOR PRECONCEPTION CONSULTATION: Primary | ICD-10-CM

## 2024-01-18 DIAGNOSIS — E84.9 CF (CYSTIC FIBROSIS) (H): Primary | ICD-10-CM

## 2024-01-18 DIAGNOSIS — K59.00 CONSTIPATION, UNSPECIFIED CONSTIPATION TYPE: ICD-10-CM

## 2024-01-18 LAB
ALBUMIN SERPL BCG-MCNC: 4.9 G/DL (ref 3.5–5.2)
ALBUMIN UR-MCNC: NEGATIVE MG/DL
ALP SERPL-CCNC: 92 U/L (ref 40–150)
ALT SERPL W P-5'-P-CCNC: 15 U/L (ref 0–50)
ANION GAP SERPL CALCULATED.3IONS-SCNC: 9 MMOL/L (ref 7–15)
APPEARANCE UR: CLEAR
AST SERPL W P-5'-P-CCNC: 22 U/L (ref 0–45)
BASOPHILS # BLD AUTO: 0 10E3/UL (ref 0–0.2)
BASOPHILS NFR BLD AUTO: 1 %
BILIRUB DIRECT SERPL-MCNC: 0.34 MG/DL (ref 0–0.3)
BILIRUB SERPL-MCNC: 1.6 MG/DL
BILIRUB UR QL STRIP: NEGATIVE
BUN SERPL-MCNC: 11.6 MG/DL (ref 6–20)
CALCIUM SERPL-MCNC: 9.6 MG/DL (ref 8.6–10)
CHLORIDE SERPL-SCNC: 107 MMOL/L (ref 98–107)
CHOLEST SERPL-MCNC: 124 MG/DL
CK SERPL-CCNC: 87 U/L (ref 26–192)
COLOR UR AUTO: NORMAL
CREAT SERPL-MCNC: 0.68 MG/DL (ref 0.51–0.95)
DEPRECATED HCO3 PLAS-SCNC: 25 MMOL/L (ref 22–29)
EGFRCR SERPLBLD CKD-EPI 2021: >90 ML/MIN/1.73M2
EOSINOPHIL # BLD AUTO: 0.1 10E3/UL (ref 0–0.7)
EOSINOPHIL NFR BLD AUTO: 2 %
ERYTHROCYTE [DISTWIDTH] IN BLOOD BY AUTOMATED COUNT: 12.8 % (ref 10–15)
ERYTHROCYTE [SEDIMENTATION RATE] IN BLOOD BY WESTERGREN METHOD: 7 MM/HR (ref 0–20)
EXPTIME-PRE: 3.49 SEC
FASTING STATUS PATIENT QL REPORTED: NORMAL
FEF2575-%PRED-PRE: 91 %
FEF2575-PRE: 3.06 L/SEC
FEF2575-PRED: 3.35 L/SEC
FEFMAX-%PRED-PRE: 107 %
FEFMAX-PRE: 6.59 L/SEC
FEFMAX-PRED: 6.13 L/SEC
FEV1-%PRED-PRE: 110 %
FEV1-PRE: 2.91 L
FEV1FEV6-PRE: 87 %
FEV1FEV6-PRED: 87 %
FEV1FVC-PRE: 87 %
FEV1FVC-PRED: 90 %
FIFMAX-PRE: 5.18 L/SEC
FVC-%PRED-PRE: 114 %
FVC-PRE: 3.36 L
FVC-PRED: 2.94 L
GGT SERPL-CCNC: 12 U/L (ref 5–36)
GLUCOSE SERPL-MCNC: 87 MG/DL (ref 70–99)
GLUCOSE UR STRIP-MCNC: NEGATIVE MG/DL
HBA1C MFR BLD: 5.4 %
HCT VFR BLD AUTO: 39.7 % (ref 35–47)
HDLC SERPL-MCNC: 53 MG/DL
HGB BLD-MCNC: 14 G/DL (ref 11.7–15.7)
HGB UR QL STRIP: NEGATIVE
IMM GRANULOCYTES # BLD: 0 10E3/UL
IMM GRANULOCYTES NFR BLD: 0 %
INR PPP: 1.18 (ref 0.85–1.15)
IRON SERPL-MCNC: 130 UG/DL (ref 37–145)
KETONES UR STRIP-MCNC: NEGATIVE MG/DL
LDLC SERPL CALC-MCNC: 57 MG/DL
LEUKOCYTE ESTERASE UR QL STRIP: NEGATIVE
LYMPHOCYTES # BLD AUTO: 2.6 10E3/UL (ref 0.8–5.3)
LYMPHOCYTES NFR BLD AUTO: 44 %
MAGNESIUM SERPL-MCNC: 2.1 MG/DL (ref 1.7–2.3)
MCH RBC QN AUTO: 30.2 PG (ref 26.5–33)
MCHC RBC AUTO-ENTMCNC: 35.3 G/DL (ref 31.5–36.5)
MCV RBC AUTO: 86 FL (ref 78–100)
MONOCYTES # BLD AUTO: 0.3 10E3/UL (ref 0–1.3)
MONOCYTES NFR BLD AUTO: 5 %
NEUTROPHILS # BLD AUTO: 2.9 10E3/UL (ref 1.6–8.3)
NEUTROPHILS NFR BLD AUTO: 48 %
NITRATE UR QL: NEGATIVE
NONHDLC SERPL-MCNC: 71 MG/DL
NRBC # BLD AUTO: 0 10E3/UL
NRBC BLD AUTO-RTO: 0 /100
PH UR STRIP: 6.5 [PH] (ref 5–7)
PHOSPHATE SERPL-MCNC: 3.6 MG/DL (ref 2.5–4.5)
PLATELET # BLD AUTO: 317 10E3/UL (ref 150–450)
POTASSIUM SERPL-SCNC: 4 MMOL/L (ref 3.4–5.3)
PROT SERPL-MCNC: 7.7 G/DL (ref 6.4–8.3)
RBC # BLD AUTO: 4.63 10E6/UL (ref 3.8–5.2)
RBC URINE: 1 /HPF
SODIUM SERPL-SCNC: 141 MMOL/L (ref 135–145)
SP GR UR STRIP: 1.01 (ref 1–1.03)
SQUAMOUS EPITHELIAL: 1 /HPF
TRIGL SERPL-MCNC: 72 MG/DL
TSH SERPL DL<=0.005 MIU/L-ACNC: 0.91 UIU/ML (ref 0.3–4.2)
UROBILINOGEN UR STRIP-MCNC: NORMAL MG/DL
WBC # BLD AUTO: 6 10E3/UL (ref 4–11)
WBC URINE: <1 /HPF

## 2024-01-18 PROCEDURE — 80053 COMPREHEN METABOLIC PANEL: CPT | Performed by: PATHOLOGY

## 2024-01-18 PROCEDURE — 80061 LIPID PANEL: CPT | Performed by: PATHOLOGY

## 2024-01-18 PROCEDURE — 85610 PROTHROMBIN TIME: CPT | Performed by: PATHOLOGY

## 2024-01-18 PROCEDURE — 84443 ASSAY THYROID STIM HORMONE: CPT | Performed by: PATHOLOGY

## 2024-01-18 PROCEDURE — 99213 OFFICE O/P EST LOW 20 MIN: CPT | Performed by: PHYSICIAN ASSISTANT

## 2024-01-18 PROCEDURE — 94375 RESPIRATORY FLOW VOLUME LOOP: CPT | Performed by: PHYSICIAN ASSISTANT

## 2024-01-18 PROCEDURE — 84100 ASSAY OF PHOSPHORUS: CPT | Performed by: PATHOLOGY

## 2024-01-18 PROCEDURE — 83540 ASSAY OF IRON: CPT | Performed by: PATHOLOGY

## 2024-01-18 PROCEDURE — 87186 SC STD MICRODIL/AGAR DIL: CPT | Performed by: PHYSICIAN ASSISTANT

## 2024-01-18 PROCEDURE — 83735 ASSAY OF MAGNESIUM: CPT | Performed by: PATHOLOGY

## 2024-01-18 PROCEDURE — 82043 UR ALBUMIN QUANTITATIVE: CPT | Performed by: PHYSICIAN ASSISTANT

## 2024-01-18 PROCEDURE — 82550 ASSAY OF CK (CPK): CPT | Performed by: PATHOLOGY

## 2024-01-18 PROCEDURE — 85652 RBC SED RATE AUTOMATED: CPT | Performed by: PATHOLOGY

## 2024-01-18 PROCEDURE — 87106 FUNGI IDENTIFICATION YEAST: CPT | Performed by: PHYSICIAN ASSISTANT

## 2024-01-18 PROCEDURE — 84590 ASSAY OF VITAMIN A: CPT | Mod: 90 | Performed by: PATHOLOGY

## 2024-01-18 PROCEDURE — 82977 ASSAY OF GGT: CPT | Performed by: PATHOLOGY

## 2024-01-18 PROCEDURE — 99207 PR NO CHARGE LOS: CPT | Performed by: PHARMACIST

## 2024-01-18 PROCEDURE — 82785 ASSAY OF IGE: CPT | Performed by: PHYSICIAN ASSISTANT

## 2024-01-18 PROCEDURE — 82248 BILIRUBIN DIRECT: CPT | Performed by: PATHOLOGY

## 2024-01-18 PROCEDURE — 83036 HEMOGLOBIN GLYCOSYLATED A1C: CPT | Performed by: PHYSICIAN ASSISTANT

## 2024-01-18 PROCEDURE — 84446 ASSAY OF VITAMIN E: CPT | Mod: 90 | Performed by: PATHOLOGY

## 2024-01-18 PROCEDURE — 99000 SPECIMEN HANDLING OFFICE-LAB: CPT | Performed by: PATHOLOGY

## 2024-01-18 PROCEDURE — 82784 ASSAY IGA/IGD/IGG/IGM EACH: CPT | Performed by: PHYSICIAN ASSISTANT

## 2024-01-18 PROCEDURE — 87102 FUNGUS ISOLATION CULTURE: CPT | Performed by: PHYSICIAN ASSISTANT

## 2024-01-18 PROCEDURE — 77080 DXA BONE DENSITY AXIAL: CPT | Performed by: INTERNAL MEDICINE

## 2024-01-18 PROCEDURE — 81001 URINALYSIS AUTO W/SCOPE: CPT | Performed by: PATHOLOGY

## 2024-01-18 PROCEDURE — 85025 COMPLETE CBC W/AUTO DIFF WBC: CPT | Performed by: PATHOLOGY

## 2024-01-18 PROCEDURE — 82306 VITAMIN D 25 HYDROXY: CPT | Performed by: PHYSICIAN ASSISTANT

## 2024-01-18 PROCEDURE — 36415 COLL VENOUS BLD VENIPUNCTURE: CPT | Performed by: PATHOLOGY

## 2024-01-18 PROCEDURE — 99214 OFFICE O/P EST MOD 30 MIN: CPT | Mod: 25 | Performed by: PHYSICIAN ASSISTANT

## 2024-01-18 PROCEDURE — 84403 ASSAY OF TOTAL TESTOSTERONE: CPT | Performed by: PHYSICIAN ASSISTANT

## 2024-01-18 RX ORDER — PHENOL 1.4 %
10 AEROSOL, SPRAY (ML) MUCOUS MEMBRANE
COMMUNITY
End: 2024-08-07

## 2024-01-18 RX ORDER — POLYETHYLENE GLYCOL 3350 17 G/17G
17 POWDER, FOR SOLUTION ORAL DAILY PRN
Start: 2024-01-18

## 2024-01-18 ASSESSMENT — PAIN SCALES - GENERAL: PAINLEVEL: NO PAIN (0)

## 2024-01-18 NOTE — PATIENT INSTRUCTIONS
See provider AVS for a summary of recommendations from today's visit.    Charley Shepherd, PharmD  Cystic Fibrosis MTM Pharmacist  [facility]  Voicemail: [phone]

## 2024-01-18 NOTE — PATIENT INSTRUCTIONS
"Cystic Fibrosis Self-Care Plan       Patient: Valencia Casiano   MRN: [id number]   Clinic Date: January 18, 2024     RECOMMENDATIONS:  1. Continue nebulizers and vest therapy as needed.  2 We will schedule you with Darion Sofia as soon as possible to discuss recommendations from October.  3. Maternal fetal medicine consult placed, they should call you to schedule visit.     Annual Studies:   No results found for: \"IGG\"  No results found for: \"INS\"  There are no preventive care reminders to display for this patient.    Pulmonary Function Tests  FEV1: amount of air you can blow out in 1 second  FVC: total amount of air you can take in and blow out    Your Goals:             Latest Ref Rng & Units 1/18/2024    11:08 AM   PFT   FVC L 3.36  P   FEV1 L 2.91  P   FVC% % 114  P   FEV1% % 110  P      P Preliminary result          Airway Clearance: The Most Important Way to Keep Your Lungs Healthy  Vest Settings:   Hill-Rom Frequencies: 8, 9, 10 Pressure 10 Then, Frequencies 18, 19, 20 Pressure 6     [employer]: Quick Start with Pressure of     Do each frequency for 5 minutes; Deflate vest after each frequency & cough 3 times before beginning the next setting.    Vest and Neb Therapy should be done as needed.     Good Nutrition Can Improve Lung Function and Overall Health    Take ALL of your vitamins with food    Take 1/2 of your enzymes before EVERY meal/snack and the other 1/2 mid-meal/snack    Wt Readings from Last 3 Encounters:   10/12/23 49.9 kg (110 lb 0.2 oz)       There is no height or weight on file to calculate BMI.         National CF Foundation Recommendations for BMI in CF Adults: Women: at least 22 Men: at least 23        Controlling Blood Sugars Helps Prevent Lung Infections & Improves Nutrition  Test blood sugar:    In the morning before eating (goal is )    2 hours after a meal (goal is less than 150)    When pre-meal glucose is greater than 150 add correction    At bedtime (if less than 100 " "eat a snack with 15 grams of carbohydrates  Last A1C Results: No results found for: \"A1C\"      If diabetic, measure A1C every 6 months. Goal is under 7%.    Staying Healthy  Research: If you are interested in learning about research opportunities or have questions, please contact Sandee Neely at [phone] or [email or web address].     Foundation: Compass is a personalized resource service to help you with the insurance, financial, legal and other issues you are facing.  It's free, confidential and available to anyone with CF.  Ask your  for more information or contact Compass directly at 697-COMPASS ([phone]) or compass@cff.org, or learn more at cff.org/compass.       CF Nurse Line: Rahel Hair and KJ: [phone]  Tracy Malagon or Emerald Pedro RT: [phone]    Cele Chowdhury and Margie Smith , Dieticians: [phone]    Kirsty Garvin, Diabetes Nurse: [phone]   Kitty Hernandez: [phone] or Amy Heller at [phone], Social Workers  [email or web address]    "

## 2024-01-18 NOTE — NURSING NOTE
"Valencia Casiano is a 18 year old year old who is being seen for Cystic Fibrosis (CF follow up )      Medications reviewed and Vital signs taken.    Specimen Collection Type: N/A patient left without specimen    Order(s) placed: patient left sputum in lab    *IF [employer]B order placed - please enter \"PRIOR[employer]IZE [employer]\" to order comments.       No results found for: \"ACIDFAST\"      No results found for: \"AFBSMS\"    Vitals were taken and medications were reconciled.     Emmy MODIA  11:19 AM      "

## 2024-01-18 NOTE — LETTER
1/18/2024         RE: Valencia Casiano  [address]  [place] [address]        Dear Colleague,    Thank you for referring your patient, Valencia Casiano, to the [facility] [place]. Please see a copy of my visit note below.    [facility]  January 18, 2024         Assessment and Plan:   Valencia Casiano is a 18 year old female with h/o with cystic fibrosis, exocrine pancreatic insufficiency and constipation who is seen today for routine follow up. [facility]     1. Cystic fibrosis: no new pulmonary complaints, was sick Thanksgiving through [place], but feels she has gotten over that illness. Sating 100% on room air; does not neb or vest unless she is unwell. PFTs today are stable at her baseline. Prior cultures + for MRSA from throat cultures, MSSA, Pseudomonas fluorescens, stenotrophomonas, aspergillus (normal vipul on culture from 6/16). No acute issues.  - Nebs and vesting PRN symptoms      2. CFTR modulation: [id number], on Trikafta and tolerating it well. Bilirubin in Nov of 1.4, up to 1.6 today. Also with elevated INR. Suspect could be related to multiple supplements she bought online, Charley to see today, will plan to hold supplements.   - Bilirubin and INR in 2-3 weeks  - Continue Trikafta     3. Exocrine pancreatic insufficiency:  Abdominal pain and constipation: with repeat fecal elastase on 6/22 < 50. Denies s/s of malabsorption. Ongoing varialbe stools with some blood per patient, predominantly constipation. Notes she did not tolerate drinking oral mucomyst and Miralax is not helpful. Reviewed GI notes with the patient (from 101/16) and patient states she was unaware of any of the recommendations. Given 3 months since visit, will have her see CHRIS Sofia, asap.   - Continue Creon and vitamins  - Per notes, should start linaclotide 145 mcg and  undergo colonoscopy given on going blood, but will defer to GI    4. ABPA: prior history, she does not recall if she was on steroids. She is not currently on ICS or steroids.   - IgE is pending     5. Family planning: per notes, would like to get rid of her IUD and has not plan for other forms of contraception. Talking with genetics, Sherry, at this time. Would like to discuss pregnancy with MFM.  - Referral placed     RTC: in 3 months   Annual studies due: January 2024; CXR today, need to schedule OGTT   Preventative care: flu shot completed; discussed covid booster     Roro Kirkpatrick PA-C  Pulmonary, Allergy, Critical Care and Sleep Medicine        Interval History:     Insurance ended on 12/1, but back on track. Overall, feeling okay. [facility] [place] [place] [facility] [place] her face. Thinks she had covid, but never tested. No fever or chills, no sinus or nasal congestion, minimal drippy nose, no PND. Minimal cough, does have some mild shortness of breath with cleaning. Did a neb and vest x 1 when she felt sick. Feels like there is good days and bad days with her GI issues: stools will alternate between being hard and diarrhea. Constipation is present a lot of the time, diarrhea is less frequently. Started taking supplements from [facility] a month ago. Still having bloating, but no pain. Feels her GI symptoms are slightly better from Oct.          Review of Systems:   Please see HPI. Otherwise, complete 10 point ROS negative.           Past Medical and Surgical History:     No past medical history on file.  No past surgical history on file.        Family History:     No family history on file.         Social History:     Social History     Socioeconomic History    Marital status: Single     Spouse name: Not on file    Number of children: Not on file    Years of education: Not on file    Highest education level: Not on file   Occupational History    Not on file   Tobacco Use    Smoking status: Unknown     Smokeless tobacco: Not on file   Substance and Sexual Activity    Alcohol use: Not on file    Drug use: Not on file    Sexual activity: Not on file   Other Topics Concern    Not on file   Social History Narrative    Not on file     Social Determinants of Health     Financial Resource Strain: Not on file   Food Insecurity: Not on file   Transportation Needs: Not on file   Physical Activity: Not on file   Stress: Not on file   Social Connections: Not on file   Interpersonal Safety: Not on file   Housing Stability: Not on file            Medications:     Current Outpatient Medications   Medication    elexacaftor-tezacaftor-ivacaftor & ivacaftor (TRIKAFTA) 100-50-75 & 150 MG tablet pack    polyethylene glycol (MIRALAX) 17 GM/Dose powder    albuterol (PROAIR HFA/PROVENTIL HFA/VENTOLIN HFA) 108 (90 Base) MCG/ACT inhaler    albuterol (PROVENTIL) (2.5 MG/3ML) 0.083% neb solution    cetirizine (ZYRTEC) 10 MG tablet    fluticasone (FLONASE) 50 MCG/ACT nasal spray    lipase-protease-amylase (CREON) 28431-93331-070213 units CPEP per EC capsule    Melatonin 10 MG TABS tablet    multivitamin (ONE-DAILY) tablet    sodium chloride inhalant 7 % NEBU neb solution     No current facility-administered medications for this visit.            Physical Exam:   /62 (BP Location: Right arm)   Pulse 78   SpO2 100%     GENERAL: alert, NAD  HEENT: NCAT, EOMI, anicteric sclera, no nasal edema or erythema; canals and TMs clear; no oral mucosal edema or erythema  Neck: no cervical or supraclavicular adenopathy  Respiratory: good air flow, no crackles, rhonchi or wheezing  CV: RRR, S1S2, no murmurs noted  Abdomen: normoactive BS, soft, mild epigastric tenderness  Lymph: no edema, + digital clubbing  Neuro: AAO X 3, CN 2-12 grossly intact  Psychiatric: normal affect, good eye contact  Skin: no rash, jaundice or lesions on limited exam         Data:   All laboratory and imaging data reviewed.      Cystic Fibrosis Culture  No results found  "for: \"SDES\" No results found for: \"CULT\"     PFT interpretation:  Maneuver: valid and meets ATS guidelines  Normal spirometry    "

## 2024-01-18 NOTE — PROGRESS NOTES
Disease State Management Encounter:                          Valencia Casiano is a 18 year old female seen for  a covisit with Dr. Gallo and team.    Reason for visit: Annual Medication Review     Medication Adherence/Access:    Medication: Valencia manages her own medications at home  Pharmacy: [facility]    CF:   Inhaled medications:  Bronchodilator: albuterol nebs 0.083% daily as needed for exacerbations and albuterol inhaler as needed  Mucolytic: hypertonic saline 7% nebs as needed for exacerbations  Other: Flonase as needed  Oral medications:  CFTR modulator: Trikafta (full dose)   Other: cetirizine 10 mg as needed    Genotype: [id number]  Sputum Cultures grow: normal vipul  Patient reports no concerns about CF medication regimen.  Lab Results   Component Value Date    ALT 15 01/18/2024    AST 22 01/18/2024    BILITOTAL 1.6 (H) 01/18/2024    DBIL 0.34 (H) 01/18/2024    CKT 87 01/18/2024       Pancreatic Insufficiency/Nutrition:   Creon 74813 taking 4 capsules with meals and 4 capsules with snacks.   Bowel regimen: Miralax 17 g as needed (2x/week)  Vitamins: women's multivitamin daily; Arbonne EssentialMeal, GreenSynergy Elixir, and ImmunityFizz.    Patient is experiencing sign/symptoms of malabsorption/intermittent abdominal pain.  INR   Date Value Ref Range Status   01/18/2024 1.18 (H) 0.85 - 1.15 Final        PFTs:        Assessment/Plan:    Recheck hepatic panel and INR in 2 to 3 weeks at local clinic (orders sent by [facility])  Stop Arbonne supplements until lab draw, may be contributing to elevated bilirubin and INR      Follow-up: 3 months for clinic visit    I spent 10 minutes with this patient today. I offer these suggestions for consideration by Roro Kirkpatrick PA-C during covisit today.     A summary of these recommendations was given to the patient (see AVS from today's appointment with Roro Kirkpatrick PA-C).    Charley Shepherd, PharmD  Cystic Fibrosis [place]  Pharmacist  [facility]  Voicemail: [phone]         Medication Therapy Recommendations  Pancreatic insufficiency    Current Medication: HERBALS   Rationale: Undesirable effect - Adverse medication event - Safety   Recommendation: Discontinue Medication   Status: Patient Agreed - Adherence/Education

## 2024-01-19 LAB
CREAT UR-MCNC: 75.6 MG/DL
IGG SERPL-MCNC: 885 MG/DL (ref 610–1616)
MICROALBUMIN UR-MCNC: <12 MG/L
MICROALBUMIN/CREAT UR: NORMAL MG/G{CREAT}
VIT D+METAB SERPL-MCNC: 17 NG/ML (ref 20–50)

## 2024-01-20 LAB — TESTOST SERPL-MCNC: 13 NG/DL (ref 8–60)

## 2024-01-21 LAB
A-TOCOPHEROL VIT E SERPL-MCNC: 5 MG/L
ANNOTATION COMMENT IMP: NORMAL
BETA+GAMMA TOCOPHEROL SERPL-MCNC: 0.8 MG/L
RETINYL PALMITATE SERPL-MCNC: <0.02 MG/L
VIT A SERPL-MCNC: 0.58 MG/L

## 2024-01-23 LAB
BACTERIA SPT CULT: ABNORMAL
BACTERIA SPT CULT: ABNORMAL

## 2024-01-24 LAB — IGE SERPL-ACNC: 55 KU/L (ref 0–114)

## 2024-01-26 DIAGNOSIS — E84.9 CF (CYSTIC FIBROSIS) (H): Primary | ICD-10-CM

## 2024-01-26 DIAGNOSIS — K86.89 PANCREATIC INSUFFICIENCY: ICD-10-CM

## 2024-01-26 RX ORDER — CHOLECALCIFEROL (VITAMIN D3) 125 MCG
1 CAPSULE ORAL DAILY
Qty: 100 CAPSULE | Refills: 3 | Status: SHIPPED | OUTPATIENT
Start: 2024-01-26 | End: 2024-08-07

## 2024-01-26 NOTE — PROGRESS NOTES
Nutrition Note    Vitamin D 17 low with annual study labs. Had been taking Ergo 50,000 units weekly although stopped at recent visit.     Recs/Plan:  1) Contacted pt via [employer] regarding results. Ordered Vitamin D3 cholecalciferol 125 mcg daily.    Margie Smith RD, LD, CACFD  Cystic Fibrosis/Lung Transplant Dietitian  Pager [phone]

## 2024-02-02 ENCOUNTER — TELEPHONE (OUTPATIENT)
Dept: CARE COORDINATION | Facility: CLINIC | Age: 19
End: 2024-02-02
Payer: COMMERCIAL

## 2024-02-02 NOTE — TELEPHONE ENCOUNTER
Adult Cystic Fibrosis Program  Social Work Phone/E-mail Communication    Data:   This SW is covering for Valencia's primary SW, Amy. Valencia called in asking whether her copay assistance card would help cover counseling or wisdom teeth surgery copays. Per chart review, Valencia is enrolled in [employer] program, and SW informed Valencia that this program will not help pay for counseling or wisdom teeth surgery.     SW also noted that Valencia had been given information about CF Copay Assistance Fund, and inquired whether Valencia had applied for this program. Valencia didn't remember receiving information about this program. SW resent information via [employer] per patient's request. SW also asked Valencia if she was enrolled in other more general yuriy/copay programs, and Valencia was unsure. Writer sent message to Benjamin Marroquin, Pharmacy Liaison, to reach out to Valencia about other grants or insurance premium assistance funds that may be available.     Valencia denied having additional questions/concerns for SW at this time.Verified she has CF SW contact info and encouraged her to contact SW at anytime.    Intervention:   -Insurance/financial counseling    Assessment: Valencia called with questions about what her copay assistance card would help pay for. SW answered questions and directed unanswered questions to CF Pharmacy Liaison.     Plan:   Continue to assist with health insurance concern(s)  Continue to follow through regular clinic consult and annual visit         DIMPLE Ayala  Adult Cystic Fibrosis   Ph: [phone], Pager: [phone]

## 2024-02-08 ENCOUNTER — CLINICAL UPDATE (OUTPATIENT)
Dept: PHARMACY | Facility: CLINIC | Age: 19
End: 2024-02-08
Payer: COMMERCIAL

## 2024-02-08 DIAGNOSIS — E84.9 CF (CYSTIC FIBROSIS) (H): Primary | ICD-10-CM

## 2024-02-08 PROCEDURE — 99207 PR NO CHARGE LOS: CPT | Performed by: PHARMACIST

## 2024-02-08 NOTE — PROGRESS NOTES
Clinical Update:                                                    At the request of Roro Kirkpatrick PA-C, a chart review was conducted for Valencia Casiano.    Reason for Chart Review: Trikafta Lab Monitoring     Discussion: Valencia has been on Trikafta since ~2020. Valencia has a history of bilirubin abnormalities which has required closer monitoring Per chart review, patient continues full dose Trikafta .    Labs were reviewed from 2/8/2024 at  [facility] . bilirubin is elevated at 1.4 x the upper limit of normal, Majority of bilirubin elevation is indirect bilirubin which can be caused by BMIV4F8/3 inhibition by elexacaftor and may not require intervention, however this is increased from previous          Plan:  1. Continue Trikafta   2. Recheck hepatic panel in 1 month, consider additional hepatic workup        Charley Shepherd, PharmD  Cystic Fibrosis MTM Pharmacist  [facility]  Voicemail: [phone]

## 2024-02-15 LAB
BACTERIA SPT CULT: ABNORMAL
BACTERIA SPT CULT: NO GROWTH

## 2024-03-19 ENCOUNTER — CLINICAL UPDATE (OUTPATIENT)
Dept: PHARMACY | Facility: CLINIC | Age: 19
End: 2024-03-19
Payer: COMMERCIAL

## 2024-03-19 DIAGNOSIS — E84.9 CF (CYSTIC FIBROSIS) (H): Primary | ICD-10-CM

## 2024-03-19 PROCEDURE — 99207 PR NO CHARGE LOS: CPT | Performed by: PHARMACIST

## 2024-03-19 NOTE — PROGRESS NOTES
Clinical Update:    A chart review was conducted for Valencia Casiano.    Reason for Chart Review: Trikafta Lab Monitoring     Discussion: Valencia has been on Trikafta since ~2020. Valencia has a history of bilirubin abnormalities which has required closer monitoring. Per chart review, patient continues full dose Trikafta .    Labs were reviewed from  3/18/24  at  [facility] . bilirubin is elevated at 1.2 x the upper limit of normal. majority is still indirect bilirubin which can be caused by GPKT8V9/3 inhibition by elexacaftor and does not typically require intervention, however this remains increased, although slightly lower compared to one month ago.  CK normal on 1/18/24.          Plan:  1. Continue Trikafta   2. Recheck hepatic panel and CK in  2 months , per discussion with Roro Kirkpatrick PA-C.        Kaci Villarreal, PharmD  Medication Therapy Management Pharmacist

## 2024-03-20 ENCOUNTER — TELEPHONE (OUTPATIENT)
Dept: PULMONOLOGY | Facility: CLINIC | Age: 19
End: 2024-03-20
Payer: COMMERCIAL

## 2024-03-20 NOTE — TELEPHONE ENCOUNTER
I returned a telephone call from Valencia regarding her desire to pursue carrier screening for the conditions her partner Jesus was recently found to be a carrier for.  A virtual genetic counseling visit was scheduled at her convenience.  I remain available for any additional questions or concerns in the meantime.       Jen Mckeon MS, [facility]  Genetic Counselor  [facility]  [facility]

## 2024-04-03 ENCOUNTER — VIRTUAL VISIT (OUTPATIENT)
Dept: PULMONOLOGY | Facility: CLINIC | Age: 19
End: 2024-04-03
Attending: GENETIC COUNSELOR, MS
Payer: COMMERCIAL

## 2024-04-03 DIAGNOSIS — E84.9 CF (CYSTIC FIBROSIS) (H): Primary | ICD-10-CM

## 2024-04-03 DIAGNOSIS — Z31.5 ENCOUNTER FOR PROCREATIVE GENETIC COUNSELING: ICD-10-CM

## 2024-04-03 DIAGNOSIS — Z13.71 SCREENING FOR GENETIC DISEASE CARRIER STATUS: ICD-10-CM

## 2024-04-03 PROCEDURE — 96040 HC GENETIC COUNSELING, EACH 30 MINUTES: CPT | Mod: GT,95 | Performed by: GENETIC COUNSELOR, MS

## 2024-04-03 NOTE — LETTER
4/3/2024      RE: Valencia Casiano  [address] [address]  [place] [address]     Dear Colleague,    Thank you for the opportunity to participate in the care of your patient, Valencia Casiano, at the [facility] at [facility]. Please see a copy of my visit note below.    Virtual Visit Details    Type of service:  Video Visit     Video Visit Start Time: 12:59pm  Video Visit End Time:  1:19pm    Originating Location (pt. Location): Home  Distant Location (provider location):  On-site  Platform used for Video Visit: [employer]      Presenting Information:  Valencia Casiano is a 18-year-old woman with cystic fibrosis.  Her genotype is [id number] homozygous.  Valencia was seen today for a virtual genetic counseling visit to pursue carrier screening following her partner Jesus's positive carrier screen for congenital adrenal hyperplasia (CAH) due to 21-hydroxylase deficiency and very long chain acyl-CoA dehydrogenase (VLCAD) deficiency.  During today's visit I collected a brief family history, reviewed the genetics and inheritance of these conditions, and discussed the options for carrier screening.  At the conclusion of our visit Valencia opted to proceed with carrier screening for these two conditions and informed consent was obtained.      Family History:  A pedigree was obtained and scanned into the electronic medical record.  It is significant for the following:   Valencia does not yet have children by choice.  Valencia has one sister who is healthy.  Valencia's mother is 38-years-old and healthy.  Valencia's father is 43-years-old and healthy.  Valencia's partner Jesus is 21-years-old.  He has ADHD but is otherwise healthy.  Jesus has no known family history of cystic fibrosis or other known genetic conditions.  Valencia is of unknown  ancestry.  Consanguinity was denied.       Discussion:  Today we reviewed that genes are long stretches of DNA  that are responsible for how our bodies look and how our bodies work. We all have two copies of most genes; one inherited from the mother and one inherited from the father. When there is a change, called a mutation, in a gene it can cause it to not do its job correctly which can cause the signs and symptoms of a genetic condition.      Many genetic conditions including cystic fibrosis, CAH, and VLCAD deficiency, are inherited in an autosomal recessive pattern. This means that to be affected an individual must inherit a mutation on both copies of a gene (one from each parent). Individuals with just one mutation are said to be carriers. Carriers do not have the condition but can have an affected child if their partner is also a carrier. When both parents are carriers, with each pregnancy there is a 25% chance for the child to be affected, a 50% chance for the child to be a carrier only, and a 25% chance for the child to be unaffected and not a carrier.      As noted above, Valencia herself has cystic fibrosis with a mutation called [id number] on both copies of the gene for cystic fibrosis, CFTR.  Any child she has will therefore inherit a [id number] mutation from her.  To clarify their chances to have a child with cystic fibrosis, Valencia's partner Jesus previously underwent expanded carrier screening.  Jesus's carrier screening for cystic fibrosis returned negative (normal).  This very significantly reduces the chance for the couple to have a child with cystic fibrosis from approximately 1/50 to approximately 1/8800.    By carrier screening, Jesus was found to be a carrier for two other recessive conditions: Congenital adrenal hyperplasia (CAH) due to 21-hydroxylase deficiency and very long chain acyl-CoA dehydrogenase (VLCAD) deficiency.  Today we reviewed that CAH is a condition that affects the adrenal glands' ability to produce certain hormones and instead leads to the overproduction of androgens (male sex  hormones).  VLCAD deficiency causes the body to not be able to break down a certain type of fat in the diet, leading to hypoglycemia and problems with the muscles and the heart.  To clarify the chances for the couple to have a child with one of these conditions, Valencia wished to proceed with carrier screening.       We reviewed the options for carrier screening including for CAH and VLCAD only, or expanded carrier screening for a large panel of additional conditions.  We had a detailed discussion about the potential costs, benefits, and limitations of the options.  Because the remainder of Jesus's carrier screening was negative (normal) even if Valencia is found to be a carrier for another condition that Jesus screened negative for, the chance for the couple to have a child with that condition would still be low.  We also discussed the limitations of carrier screening including that a negative (normal) result does not completely rule out the possibility of having an affected child.  There are also other genetic conditions not included on carrier screening as well as genetic conditions that are new in a child.  Finally there are conditions that are not genetic in nature.  After discussion Valencia opted to proceed with carrier screening for just CAH and VLCAD deficiency.  Verbal informed consent was obtained and witnessed.       Carrier screening will be performed at [facility] who will contact Valencia directly with an estimate of insurance benefits and option for out of pocket billing.  A sample collection kit will be sent to her home.  Results are expected in approximately 2-3 weeks and I will contact Valencia by telephone when results return.  Additional recommendations including additional genetic counseling as needed will be made at that time.       I appreciate the opportunity to be a part of Valencia's care today.  My contact information was shared should she have any additional questions or  concerns.       Plan:  1.  Carrier screening for CAH due to 21-hydroxylase deficiency and VLCAD deficiency, to be performed at [facility]  2.  [facility] will contact Valencia directly with an estimation of insurance benefits   3.  A saliva kit will be sent to Valencia's home for collection  4.  I will contact Valencia when results return, approximately 2-3 weeks after her sample is submitted  5.  Follow-up as determined by results of the above screening       Jen Mckeon MS, [facility]  Genetic Counselor  [facility]  [facility]         Please do not hesitate to contact me if you have any questions/concerns.     Sincerely,       Jen Mckeon, ROYAL

## 2024-04-11 NOTE — PROGRESS NOTES
Virtual Visit Details    Type of service:  Video Visit     Video Visit Start Time: 12:59pm  Video Visit End Time:  1:19pm    Originating Location (pt. Location): Home  Distant Location (provider location):  On-site  Platform used for Video Visit: [employer]      Presenting Information:  Valencia Casiano is a 18-year-old woman with cystic fibrosis.  Her genotype is [id number] homozygous.  Valencia was seen today for a virtual genetic counseling visit to pursue carrier screening following her partner Jesus's positive carrier screen for congenital adrenal hyperplasia (CAH) due to 21-hydroxylase deficiency and very long chain acyl-CoA dehydrogenase (VLCAD) deficiency.  During today's visit I collected a brief family history, reviewed the genetics and inheritance of these conditions, and discussed the options for carrier screening.  At the conclusion of our visit Valencia opted to proceed with carrier screening for these two conditions and informed consent was obtained.      Family History:  A pedigree was obtained and scanned into the electronic medical record.  It is significant for the following:   Valencia does not yet have children by choice.  Valencia has one sister who is healthy.  Valencia's mother is 38-years-old and healthy.  Valencia's father is 43-years-old and healthy.  Valencia's partner Jesus is 21-years-old.  He has ADHD but is otherwise healthy.  Jesus has no known family history of cystic fibrosis or other known genetic conditions.  Valencia is of unknown  ancestry.  Consanguinity was denied.       Discussion:  Today we reviewed that genes are long stretches of DNA that are responsible for how our bodies look and how our bodies work. We all have two copies of most genes; one inherited from the mother and one inherited from the father. When there is a change, called a mutation, in a gene it can cause it to not do its job correctly which can cause the signs and symptoms of a genetic condition.      Many genetic  conditions including cystic fibrosis, CAH, and VLCAD deficiency, are inherited in an autosomal recessive pattern. This means that to be affected an individual must inherit a mutation on both copies of a gene (one from each parent). Individuals with just one mutation are said to be carriers. Carriers do not have the condition but can have an affected child if their partner is also a carrier. When both parents are carriers, with each pregnancy there is a 25% chance for the child to be affected, a 50% chance for the child to be a carrier only, and a 25% chance for the child to be unaffected and not a carrier.      As noted above, Valencia herself has cystic fibrosis with a mutation called [id number] on both copies of the gene for cystic fibrosis, CFTR.  Any child she has will therefore inherit a [id number] mutation from her.  To clarify their chances to have a child with cystic fibrosis, Valencia's partner Jesus previously underwent expanded carrier screening.  Jesus's carrier screening for cystic fibrosis returned negative (normal).  This very significantly reduces the chance for the couple to have a child with cystic fibrosis from approximately 1/50 to approximately 1/8800.    By carrier screening, Jesus was found to be a carrier for two other recessive conditions: Congenital adrenal hyperplasia (CAH) due to 21-hydroxylase deficiency and very long chain acyl-CoA dehydrogenase (VLCAD) deficiency.  Today we reviewed that CAH is a condition that affects the adrenal glands' ability to produce certain hormones and instead leads to the overproduction of androgens (male sex hormones).  VLCAD deficiency causes the body to not be able to break down a certain type of fat in the diet, leading to hypoglycemia and problems with the muscles and the heart.  To clarify the chances for the couple to have a child with one of these conditions, Valencia wished to proceed with carrier screening.       We reviewed the options for carrier  screening including for CAH and VLCAD only, or expanded carrier screening for a large panel of additional conditions.  We had a detailed discussion about the potential costs, benefits, and limitations of the options.  Because the remainder of Jesus's carrier screening was negative (normal) even if Valencia is found to be a carrier for another condition that Jesus screened negative for, the chance for the couple to have a child with that condition would still be low.  We also discussed the limitations of carrier screening including that a negative (normal) result does not completely rule out the possibility of having an affected child.  There are also other genetic conditions not included on carrier screening as well as genetic conditions that are new in a child.  Finally there are conditions that are not genetic in nature.  After discussion Valencia opted to proceed with carrier screening for just CAH and VLCAD deficiency.  Verbal informed consent was obtained and witnessed.       Carrier screening will be performed at [facility] who will contact Valencia directly with an estimate of insurance benefits and option for out of pocket billing.  A sample collection kit will be sent to her home.  Results are expected in approximately 2-3 weeks and I will contact Valencia by telephone when results return.  Additional recommendations including additional genetic counseling as needed will be made at that time.       I appreciate the opportunity to be a part of Valencia's care today.  My contact information was shared should she have any additional questions or concerns.       Plan:  1.  Carrier screening for CAH due to 21-hydroxylase deficiency and VLCAD deficiency, to be performed at [facility]  2.  [facility] will contact Valencia directly with an estimation of insurance benefits   3.  A saliva kit will be sent to Valencia's home for collection  4.  I will contact Valencia when results return, approximately  2-3 weeks after her sample is submitted  5.  Follow-up as determined by results of the above screening       Jen Mckeon MS, [facility] [place]  Genetic Counselor  [facility]  [facility]

## 2024-04-30 ENCOUNTER — TELEPHONE (OUTPATIENT)
Dept: PULMONOLOGY | Facility: CLINIC | Age: 19
End: 2024-04-30
Payer: COMMERCIAL

## 2024-04-30 NOTE — TELEPHONE ENCOUNTER
I contacted Valencia to disclose and discuss the results of her recent carrier screening.  Screening included congenital adrenal hyperplasia (CAH) and very long chain acyl-CoA dehydrogenase (VLCAD) deficiency due to Valencia's partner Jesus's positive carrier status for these conditions.  Valencia's screening for both conditions has returned negative (normal) with no pathogenic mutations identified.  This very significantly reduces the chance for Valencia and Jesus to have a child with one of these conditions.  Valencia expressed understanding and had no additional questions at this time.  A results letter and copy of Valencia's lab report will be sent by mail.      Jen Mckeon MS [facility]  Genetic Counselor  Division of Genetics and Metabolism'

## 2024-05-30 ENCOUNTER — MYC MEDICAL ADVICE (OUTPATIENT)
Dept: PHARMACY | Facility: CLINIC | Age: 19
End: 2024-05-30
Payer: COMMERCIAL

## 2024-06-04 ENCOUNTER — PRE VISIT (OUTPATIENT)
Dept: MATERNAL FETAL MEDICINE | Facility: CLINIC | Age: 19
End: 2024-06-04
Payer: COMMERCIAL

## 2024-06-04 PROBLEM — G43.009 MIGRAINE WITHOUT AURA AND WITHOUT STATUS MIGRAINOSUS, NOT INTRACTABLE: Status: ACTIVE | Noted: 2023-01-26

## 2024-06-04 PROBLEM — E13.9 SECONDARY DIABETES MELLITUS (H): Status: ACTIVE | Noted: 2024-06-04

## 2024-06-04 PROBLEM — J45.30 MILD PERSISTENT ASTHMA: Status: ACTIVE | Noted: 2023-05-03

## 2024-06-04 PROBLEM — F54 PSYCHOLOGICAL FACTORS AFFECTING MEDICAL CONDITION: Status: ACTIVE | Noted: 2024-06-04

## 2024-06-04 PROBLEM — K86.81 EXOCRINE PANCREATIC INSUFFICIENCY: Status: ACTIVE | Noted: 2023-05-03

## 2024-06-04 PROBLEM — Z97.5 IUD (INTRAUTERINE DEVICE) IN PLACE: Status: ACTIVE | Noted: 2019-12-05

## 2024-06-04 PROBLEM — E84.9 CYSTIC FIBROSIS (H): Status: ACTIVE | Noted: 2017-12-08

## 2024-06-04 RX ORDER — SUMATRIPTAN 25 MG/1
25 TABLET, FILM COATED ORAL
COMMUNITY
Start: 2023-05-03

## 2024-06-07 NOTE — PROGRESS NOTES
Maternal-Fetal Medicine Consultation       Valencia Caisano  : 2005  MRN: [id number]    REFERRAL:  Valencia Casiano is a 18 year old sent by Roro Kirkpatrick PA-C from [facility]  for preconception consultation     HPI     Valencia Casiano is a 18 year old G0 here for [facility] preconception consultation for concerns regarding cystic fibrosis (CF).     This was a telephone encounter.     She is currently on an IUD for contraception and is considering pregnancy in the near future. Her significant other is Jesus.     Ms. Casiano was diagnosed with CF at 1 years of age. 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 She is on Trikafta, which she has tolerated well. She also has PRN inhalers and nebulizer, which she has uses about once a week.  She is not vesting. Her most recent pulmonary function testing was on 2024 which was stable with FEV1-%Pred-Pre% 110%, with prior values at 112% and 109% 8 months and 12 months, respectively. Last sputum culture was 2024 showing normal vipul and 2+ staph aureus resistant to clindamycin and erythromycin. Last saw Pulmonologist Roro Kirkpatrick PA-C on 2024.   She also has Exocrine pancreatic insufficiency and continues to take Creon and vitamins. At her last visit with Roro Kirkpatrick, she was to see GI for blood in stool and constipation. Of note, she has no history of distal intestinal obstruction syndrome.   She has also been screened for cystic fibroid related diabetes with a recent Hgb A1C 5.4% as of 2024.   She has also seen a nutritionist and her last BMI was 22 as of 10/2023.     Also, of note, her significant other was found to be a carrier for congenital adrenal hyperplasia (CAH) due to 21-hydroxylase deficiency and very long chain acyl-CoA dehydrogenase (VLCAD) deficiency on carrier screening. Thankfully, she herself had negative carrier screening for these. She does acknowledge that he has had cystic  fibrosis testing that was negative, but it is unclear if it was only carrier testing or a more comprehensive gene sequencing.        OB History    Para Term  AB Living   0 0 0 0 0 0   SAB IAB Ectopic Multiple Live Births   0 0 0 0 0     Past Medical History   Cystic fibrosis   Exocrine insufficiency     Past Surgical History   No past surgical history on file.    Medication List     Current Outpatient Medications   Medication Sig Dispense Refill    albuterol (PROAIR HFA/PROVENTIL HFA/VENTOLIN HFA) 108 (90 Base) MCG/ACT inhaler Inhale 1-2 puffs into the lungs every 4 hours as needed for shortness of breath or wheezing (Patient not taking: Reported on 2024) 18 g 3    albuterol (PROVENTIL) (2.5 MG/3ML) 0.083% neb solution Take 1 vial (2.5 mg) by nebulization 4 times daily as needed for exacerbation (Patient not taking: Reported on 2024) 270 mL 3    cetirizine (ZYRTEC) 10 MG tablet Take 1 tablet (10 mg) by mouth daily 90 tablet 3    Cholecalciferol (VITAMIN D) 125 MCG (5000 UT) capsule Take 1 capsule (5,000 Units) by mouth daily 100 capsule 3    elexacaftor-tezacaftor-ivacaftor & ivacaftor (TRIKAFTA) 100-50-75 & 150 MG tablet pack Take 2 orange tablets in the morning and 1 light blue tablet in the evening. Swallow whole with fat-containing food. 84 tablet 4    fluticasone (FLONASE) 50 MCG/ACT nasal spray Spray 2 sprays into both nostrils daily as needed for rhinitis or allergies (Patient not taking: Reported on 2024) 16 g 11    HERBALS Ar[place]ne EssentialMeal, GreenSynergy Elixir, and ImmunityFizz.      lipase-protease-amylase (CREON) 52912-60717-874614 units CPEP per EC capsule Take 4 capsules by mouth 3 times daily (with meals) and 4 capsules with snacks 3 times daily. 3 snacks/3 meals per day. 720 capsule 11    Melatonin 10 MG TABS tablet Take 10 mg by mouth nightly as needed for sleep      multivitamin (ONE-DAILY) tablet Take 1 tablet by mouth daily 100 tablet 3    polyethylene glycol  (MIRALAX) 17 GM/Dose powder Take 17 g by mouth daily as needed for constipation      sodium chloride inhalant 7 % NEBU neb solution Take 4 mLs by nebulization up to four times daily for exacerbations. (Patient not taking: Reported on 2024) 720 mL 3    SUMAtriptan (IMITREX) 25 MG tablet Take 25 mg by mouth at onset of headache for migraine       No current facility-administered medications for this visit.       Allergies   Vancomycin, Adhesive tape, and Latex    Social History     Social History     Tobacco Use    Smoking status: Never smoker       Family History   No significant family history; 2 siblings neither of which have CF    Review of Systems   10-point ROS negative except as in HPI.    Physical Exam   There were no vitals taken for this visit.    Gen: Audibly is no acute distress     Labs     Hgb A1C 5.4% (2024)  TSH 0.91 (2024)   BMP normal including Creatinine 0.68 (2024)   ALT 15, AST 22 (2024)   Hgb 14.0, MCV 86, Plt 317K     Other Imaging/Tests       Pulmonary function testing:     Ref Range & Units 4 mo ago 7 mo ago 11 mo ago     FVC-Pred  L 2.94 2.93 2.92    FVC-Pre  L 3.36 3.37 3.37    FVC-%Pred-Pre  % 114 114 115    FEV1-Pre  L 2.91 2.96 2.89    FEV1-%Pred-Pre  % 110 112 109    FEV1FVC-Pred  % 90 90 91    FEV1FVC-Pre  % 87 88 86    FEFMax-Pred  L/sec 6.13 6.12 6.09    FEFMax-Pre  L/sec 6.59 6.98 6.93    FEFMax-%Pred-Pre  % 107 113 113    FEF2575-Pred  L/sec 3.35 3.35 3.36    FEF2575-Pre  L/sec 3.06 3.28 3.00    NNY5606-%Pred-Pre  % 91 97 89    ExpTime-Pre  sec 3.49 3.84 8.07    FIFMax-Pre  L/sec 5.18 5.48 5.48    FEV1FEV6-Pred  % 87 87 87    FEV1FEV6-Pre  % 87 88 86        Assessment/Plan     Valencia Casiano is a 18 year  old here for [facility] preconception consultation for concerns regarding cystic fibrosis complicated by exocrine pancreatic insufficiency.    Cystic fibrosis (CF)  Exocrine pancreatic insufficiency     We discussed the patient s cystic fibrosis (CF)  history and current clinical health status in great detail. As she is aware, cystic fibrosis is an autosomal recessive disease that historically impacted children, however, due to therapeutic advancements there are now more adults with CF than children living with CF and as a result more women with CF are becoming pregnant. Additionally the introduction within the past decade of medications Cystic Fibrosis Transmembrane conductance Regulator (CFTR) modulators which treat the underlying cause of CF and are dramatically improving lung function as well as quality and quantity of life for people with CF. We reviewed that despite these advances there are still important factors that we evaluate for to ensure patients with CF tolerate pregnancy well.     We discussed that these factors include pulmonary function (FEV1 >50-60%) with absence of advanced CF lung disease (including absence of colonization with Burkholderia cepacia), normal cardiac function, adequate nutrition (ideally BMI > 22), presence and adequate control of cystic-fibrosis related diabetes, absence of severe liver disease, and absence of chronic kidney disease. We discussed that in general patients with CF are at risk for pregnancy complications which include CF exacerbations, nutritional deficiency, impaired glucose tolerance with need for escalating insulin therapy, constipation, vitamin A toxicity, hypertension/hypertensive disorders of pregnancy, fetal growth restriction, and  birth. We discussed that these risks are worsened in the setting of more significant/poorly controlled disease.    We discussed that in general well nourished and healthy patients with good lung function (FEV1 >60%) without co-morbid conditions and adequate control of cystic fibrosis related diabetes can be expected to do relatively well in pregnancy. We emphasized the extreme importance of ongoing multidisciplinary care and compliance with her cystic fibrosis regimen  during her pregnancy in order to optimize outcomes, both for her as well as for her fetus. Specifically, we reviewed that we would recommend continuation of her Trikafta. We reviewed that pregnant and lactating women were excluded from Phase III trials of modulators. However, data from animal models, case reports and case series have been relatively reassuring without serious safety concerns. Clinicians and women with CF must continue to weigh the potential risk of clinical decline for the mother who chooses CFTR modulator discontinuation versus the potential unidentified risk to the developing fetus of continuing CFTR modulation however given the overall favorable risk/benefit profile she will continue therapy at this time. There are active ongoing prospective trials to evaluate the potentials risks/benefits in a better way to help guide future decision making and evidence-based recommendations. It should be communicated to the pediatrician postnatally that due to the exposure to CFTR modulator therapy and the formation of cataracts in juvenile rates ophthalmologic screening is recommended. Patient continues to follow closely with Pulmonology and will return in 6 weeks for repeat PFTs.     We discussed that the recommended weight gain for pregnant patients with CF should be based on pre-pregnancy BMI (pre-pregnancy BMI of 18.5-24.9 kg/m2 weight gain of 25-35 lbs recommended and  pre-pregnancy BMI less than 18.5kg/m2 weight gain of 28-40 lbs is recommended). A referral to a dietician should be placed as needed. Vitamin supplementation (iron and folate) are similar to the non-CF pregnant population and should be adjusted based on micronutrient levels with the goal of maintaining levels in the normal range. In patients taking Vitamin A supplementation dosing should be < 10,000 international unit(s) per day with close monitoring if planning supplementation at higher doses.    Recommendations     Preconception  Recommendations:   For her CF, she can continue her Trikafta and Creon. Also, permitted to use her PRN inhalers and nebulizer as well as vesting, if needed.   Prior to attempting pregnancy a baseline evaluation of maternal health and wellbeing to identify potential and monitor pre-existing co-morbid conditions and to optimize health is recommended which includes (CBC, CMP, Hgb A1c, TSH).   Baseline echocardiogram to screen for pulmonary hypertension.   Baseline pulmonary function testing.   Continue proper nutrition with help from a nutritionist to ensure normal BMI   Updating vaccinations prior to conception, especially of live vaccine, if needed, such as MMR, varicella. Would recommend delaying pregnancy at least 12 weeks following administrations of live vaccines  Recommend initiating prenatal vitamins, folic acid supplementation, good healthy habits such as diet and exercise.   Recommend heart healthy interventions, obtaining a blood pressure cuff and periodically monitoring blood pressure.      Pregnancy Recommendations:  Baseline echocardiogram to screen for pulmonary hypertension, if not done prior to pregnancy.   Baseline Hgb A1C early in pregnancy (<15 weeks) and can proceed with usual gestational diabetes mellitus screening at 24-28 weeks gestation.    Baseline pulmonary function testing, if not done prior to pregnancy. Repeat pulmonary function testing at least every trimester.   Continue follow-up care with her pulmonologist during the pregnancy. Can coordinate visits with her pulmonologist to same day with our [facility] visits and ultrasounds, given how far she lives from [facility] (would plan to co-manage her care)  Early first trimester ultrasound to establish accurate dating of her pregnancy. This encounter can be used as her initial pregnancy consultation  MFM consult in first trimester with NT ultrasound, genetic screening if desired.   Genetic counseling visit between 11-13 weeks (at time of NT). Gene  sequencing for her significant other for cystic fibrosis, if not already done.   Comprehensive fetal anatomy ultrasound at 18-20 weeks  Growth ultrasound every 4 weeks following comprehensive fetal anatomy ultrasound.   Weekly  ultrasound surveillance starting at 32 weeks.    Timing and mode of delivery based on routine obstetric indications. If worsening maternal pulmonary status, timing of delivery would weigh maternal morbidity against fetal prematurity.   No indicated of antepartum anticoagulation, consideration for prophylactic LMWH during postpartum recovery based on BMI or if there are additional risk factors.   Close postpartum follow-up in 1-2 weeks, followed by routine postpartum follow-up at 6 weeks.      Patient seen and staffed with Dr. Lilia Alvarez.    Elio Streeter MD  Maternal Fetal Medicine Fellow PGY-5     MFM Attending Attestation  I evaluated the patient with Dr. Streeter. I reviewed her chart and agree with the above documented assessment and plan. I spent a total of 60 minutes on the date of this encounter including preparing to see the patient (reviewing medical records/tests), counseling and discussing the plan of care, documenting the visit in the electronic medical record, and communicating with other health care professionals and/or care coordination.Please see her note for specific details; I have made the necessary edits/additions.  Lilia Alvarez MD  Maternal Fetal Medicine Physician

## 2024-06-11 ENCOUNTER — VIRTUAL VISIT (OUTPATIENT)
Dept: MATERNAL FETAL MEDICINE | Facility: CLINIC | Age: 19
End: 2024-06-11
Attending: OBSTETRICS & GYNECOLOGY
Payer: COMMERCIAL

## 2024-06-11 DIAGNOSIS — E84.9 CYSTIC FIBROSIS (H): ICD-10-CM

## 2024-06-11 DIAGNOSIS — Z31.69 ENCOUNTER FOR PRECONCEPTION CONSULTATION: Primary | ICD-10-CM

## 2024-06-11 PROCEDURE — 99205 OFFICE O/P NEW HI 60 MIN: CPT | Mod: 93 | Performed by: OBSTETRICS & GYNECOLOGY

## 2024-06-11 NOTE — Clinical Note
Thank you for allowing us to participate in the care of your patient Valencia Casiano. Please see attached for details from our visit and don't hesitate to reach out with questions.   MD CAROLINE Zhao , OB/GYN Maternal-Fetal Medicine [phone] (Pager)

## 2024-06-27 ENCOUNTER — CLINICAL UPDATE (OUTPATIENT)
Dept: PHARMACY | Facility: CLINIC | Age: 19
End: 2024-06-27
Payer: COMMERCIAL

## 2024-06-27 DIAGNOSIS — E84.9 CF (CYSTIC FIBROSIS) (H): Primary | ICD-10-CM

## 2024-06-27 PROCEDURE — 99207 PR NO CHARGE LOS: CPT | Performed by: PHARMACIST

## 2024-06-27 NOTE — PROGRESS NOTES
Clinical Update:                                                    A chart review was conducted for Valencia Casiano.    Reason for Chart Review: Trikafta Lab Monitoring     Discussion: Valencia has been on Trikafta since ~2020. Valencia has a history of bilirubin abnormalities which has required closer monitoring. Per chart review, patient continues full dose Trikafta .     Labs were reviewed from  6/25/24  at  [place] . Labs within normal limits, Total bilirubin is slight elevated, majority of bilirubin elevation is indirect bilirubin which can be caused by RIZQ1D5/3 inhibition by elexacaftor and does not require intervention. Per discussion with Roro Kirkpatrick PA-C, remain on full dose Trikafta and recheck with upcoming appointment.             Plan:  1. Continue Trikafta   2. Recheck hepatic panel in 1 month with upcoming visit        Karla Amaral, PharmD   Medication Therapy Management   Cystic Fibrosis Pharmacist

## 2024-07-15 DIAGNOSIS — K86.89 PANCREATIC INSUFFICIENCY: ICD-10-CM

## 2024-07-15 DIAGNOSIS — E84.9 CF (CYSTIC FIBROSIS) (H): ICD-10-CM

## 2024-07-15 RX ORDER — MULTIVITAMIN
1 TABLET ORAL DAILY
Qty: 100 TABLET | Refills: 3 | Status: SHIPPED | OUTPATIENT
Start: 2024-07-15

## 2024-07-18 DIAGNOSIS — J30.1 SEASONAL ALLERGIC RHINITIS DUE TO POLLEN: ICD-10-CM

## 2024-07-18 DIAGNOSIS — K86.89 PANCREATIC INSUFFICIENCY: ICD-10-CM

## 2024-07-18 DIAGNOSIS — E84.9 CF (CYSTIC FIBROSIS) (H): ICD-10-CM

## 2024-07-18 RX ORDER — CETIRIZINE HYDROCHLORIDE 10 MG/1
10 TABLET ORAL DAILY
Qty: 90 TABLET | Refills: 3 | Status: SHIPPED | OUTPATIENT
Start: 2024-07-18

## 2024-07-21 ENCOUNTER — HEALTH MAINTENANCE LETTER (OUTPATIENT)
Age: 19
End: 2024-07-21

## 2024-08-01 NOTE — PROGRESS NOTES
[facility]  [facility]  August 7, 2024         Assessment and Plan:   Valencia Casiano is a 19 year old female with h/o with cystic fibrosis, exocrine pancreatic insufficiency and constipation who is seen today for routine follow up. She was last seen in January.      1. Cystic fibrosis: no new pulmonary complaints, does not exercise or vest unless she is sick. Sating 99% on room air. Per , patient has been vaping and PFTs today are slowly trending down from October 2023. Prior cultures + for MRSA from throat cultures, MSSA, Pseudomonas fluorescens, stenotrophomonas, aspergillus (normal vipul on culture from 6/16). No acute issues.  - Nebs and vesting PRN symptoms   - Will need to discuss stopping vaping with the patient     2. CFTR modulation: [id number]/[id number], on Trikafta and tolerating it well. LFTs today WNL.   - CK added on to labs  - Continue Trikafta     3. Exocrine pancreatic insufficiency:  Abdominal pain and constipation: with repeat fecal elastase on 6/22 < 50. Did not follow up with GI, notes her symptoms have improved and she is no longer having blood in her stool. Has been taking a supplement called Bloom, but review with the pharmacist advises her not to take it.   - Continue enzymes, resume vitamin D  - Vitamin D level pending for today    4. ABPA: prior history, she does not recall if she was on steroids. She is not currently on ICS or steroids. IgE of 55 in January.     5. Family planning: Seen by [facility] on 6/11 with detailed plan for pregnancy. Valencia and her boyfriend are planning to hold off for now given her uncertain plans for work or school.     6. Depression/anxiety: has a therapist. Feels her mood is variable with increased anxiety.  - MAYA Reyes, to refer to [facility]     RTC: 3 months  Annual studies due: January 2024 (overdue for OGTT)  Preventative care: will need updated vaccines     Roro Kirkpatrick PA-C  Pulmonary, Allergy, Critical Care and Sleep  Medicine        Interval History:     Working and hanging out with friends. Has not been sick, thinking about going back to school for a . No cough or congestion, Notes some watery eyes and itchy nose with drainage with allergies, taking Zyrtec. No new shortness of breath. Doesn't do airway clearance, hasn't felt sick. No nausea or vomiting, stools are sometimes normal intermixed with runny stools. No fatty or floating stools. No blood. No constipation, didn't follow UP with GI and doesn't plan to at this time.          Review of Systems:   Please see HPI. Otherwise, complete 10 point ROS negative.           Past Medical and Surgical History:     No past medical history on file.  No past surgical history on file.        Family History:     No family history on file.         Social History:     Social History     Socioeconomic History    Marital status: Single     Spouse name: Not on file    Number of children: Not on file    Years of education: Not on file    Highest education level: Not on file   Occupational History    Not on file   Tobacco Use    Smoking status: Unknown    Smokeless tobacco: Not on file   Substance and Sexual Activity    Alcohol use: Not on file    Drug use: Not on file    Sexual activity: Not on file   Other Topics Concern    Not on file   Social History Narrative    Not on file     Social Determinants of Health     Financial Resource Strain: Not on File (12/13/2023)    Received from [facility]    Financial Resource Strain     Financial Resource Strain: 0   Food Insecurity: Not on File (12/13/2023)    Received from [facility]    Food Insecurity     Food: 0   Transportation Needs: Not on File (12/13/2023)    Received from [facility]    Transportation Needs     Transportation: 0   Physical Activity: Not on File (12/13/2023)    Received from [facility]    Physical Activity     Physical Activity: 0   Stress: Not on File (12/13/2023)    Received from [facility]    Stress     Stress:  0   Social Connections: Not on File (2023)    Received from [facility]    Social Connections     Social Connections and Isolation: 0   Interpersonal Safety: Not on file   Housing Stability: Not on File (2023)    Received from [facility]    Housing Stability     Housin            Medications:     Current Outpatient Medications   Medication Sig Dispense Refill    Cholecalciferol (VITAMIN D) 125 MCG (5000 UT) capsule Take 1 capsule (5,000 Units) by mouth daily 100 capsule 3    elexacaftor-tezacaftor-ivacaftor & ivacaftor (TRIKAFTA) 100-50-75 & 150 MG tablet pack Take 2 orange tablets in the morning and 1 light blue tablet in the evening. Swallow whole with fat-containing food. 84 tablet 5    albuterol (PROAIR HFA/PROVENTIL HFA/VENTOLIN HFA) 108 (90 Base) MCG/ACT inhaler Inhale 1-2 puffs into the lungs every 4 hours as needed for shortness of breath or wheezing (Patient not taking: Reported on 2024) 18 g 3    albuterol (PROVENTIL) (2.5 MG/3ML) 0.083% neb solution Take 1 vial (2.5 mg) by nebulization 4 times daily as needed for exacerbation (Patient not taking: Reported on 2024) 270 mL 3    cetirizine (ZYRTEC) 10 MG tablet Take 1 tablet (10 mg) by mouth daily 90 tablet 3    fluticasone (FLONASE) 50 MCG/ACT nasal spray Spray 2 sprays into both nostrils daily as needed for rhinitis or allergies (Patient not taking: Reported on 2024) 16 g 11    lipase-protease-amylase (CREON) 70308-14885-804053 units CPEP per EC capsule Take 4 capsules by mouth 3 times daily (with meals) and 4 capsules with snacks 3 times daily. 3 snacks/3 meals per day. 720 capsule 11    multivitamin (ONE-DAILY) tablet Take 1 tablet by mouth daily 100 tablet 3    polyethylene glycol (MIRALAX) 17 GM/Dose powder Take 17 g by mouth daily as needed for constipation      sodium chloride inhalant 7 % NEBU neb solution Take 4 mLs by nebulization up to four times daily for exacerbations. (Patient not taking: Reported on  "1/18/2024) 720 mL 3    SUMAtriptan (IMITREX) 25 MG tablet Take 25 mg by mouth at onset of headache for migraine       No current facility-administered medications for this visit.            Physical Exam:   /74   Pulse 67   Ht 1.5 m (4' 11.06\")   Wt 49.1 kg (108 lb 3.9 oz)   SpO2 99%   BMI 21.82 kg/m      GENERAL: alert, NAD  HEENT: NCAT, EOMI, anicteric sclera, no oral mucosal edema or erythema  Neck: no cervical or supraclavicular adenopathy  Respiratory: good air flow, mainly clear  CV: RRR, S1S2, no murmurs noted  Abdomen: normoactive BS, soft  Lymph: no edema  Neuro: AAO X 3, CN 2-12 grossly intact  Psychiatric: normal affect, good eye contact  Skin: no rash, jaundice or lesions on limited exam         Data:   All laboratory and imaging data reviewed.      Cystic Fibrosis Culture  No results found for: \"SDES\" No results found for: \"CULT\"     PFT interpretation:  Maneuver: valid and meets ATS guidelines  Normal spirometry  FEV1 of 2.82 is 90 ml below prior        "

## 2024-08-07 ENCOUNTER — OFFICE VISIT (OUTPATIENT)
Dept: PULMONOLOGY | Facility: CLINIC | Age: 19
End: 2024-08-07
Attending: PHYSICIAN ASSISTANT
Payer: COMMERCIAL

## 2024-08-07 ENCOUNTER — ALLIED HEALTH/NURSE VISIT (OUTPATIENT)
Dept: CARE COORDINATION | Facility: CLINIC | Age: 19
End: 2024-08-07

## 2024-08-07 ENCOUNTER — LAB (OUTPATIENT)
Dept: LAB | Facility: CLINIC | Age: 19
End: 2024-08-07
Attending: PHYSICIAN ASSISTANT
Payer: COMMERCIAL

## 2024-08-07 ENCOUNTER — TELEPHONE (OUTPATIENT)
Dept: PULMONOLOGY | Facility: CLINIC | Age: 19
End: 2024-08-07

## 2024-08-07 ENCOUNTER — ANCILLARY PROCEDURE (OUTPATIENT)
Dept: GENERAL RADIOLOGY | Facility: CLINIC | Age: 19
End: 2024-08-07
Attending: PHYSICIAN ASSISTANT
Payer: COMMERCIAL

## 2024-08-07 ENCOUNTER — TELEPHONE (OUTPATIENT)
Dept: GASTROENTEROLOGY | Facility: CLINIC | Age: 19
End: 2024-08-07

## 2024-08-07 ENCOUNTER — OFFICE VISIT (OUTPATIENT)
Dept: PHARMACY | Facility: CLINIC | Age: 19
End: 2024-08-07

## 2024-08-07 VITALS
OXYGEN SATURATION: 99 % | DIASTOLIC BLOOD PRESSURE: 74 MMHG | BODY MASS INDEX: 21.82 KG/M2 | HEIGHT: 59 IN | HEART RATE: 67 BPM | WEIGHT: 108.25 LBS | SYSTOLIC BLOOD PRESSURE: 116 MMHG

## 2024-08-07 DIAGNOSIS — E84.9 CF (CYSTIC FIBROSIS) (H): ICD-10-CM

## 2024-08-07 DIAGNOSIS — K86.89 PANCREATIC INSUFFICIENCY: ICD-10-CM

## 2024-08-07 DIAGNOSIS — E84.9 CF (CYSTIC FIBROSIS) (H): Primary | ICD-10-CM

## 2024-08-07 DIAGNOSIS — Z13.9 RISK AND FUNCTIONAL ASSESSMENT: Primary | ICD-10-CM

## 2024-08-07 DIAGNOSIS — Z78.9 TAKES DIETARY SUPPLEMENTS: ICD-10-CM

## 2024-08-07 LAB
ALBUMIN SERPL BCG-MCNC: 4.7 G/DL (ref 3.5–5.2)
ALP SERPL-CCNC: 86 U/L (ref 40–150)
ALT SERPL W P-5'-P-CCNC: 10 U/L (ref 0–50)
AST SERPL W P-5'-P-CCNC: 25 U/L (ref 0–45)
BILIRUB DIRECT SERPL-MCNC: 0.27 MG/DL (ref 0–0.3)
BILIRUB SERPL-MCNC: 1.1 MG/DL
CK SERPL-CCNC: 323 U/L (ref 26–192)
PROT SERPL-MCNC: 7.4 G/DL (ref 6.4–8.3)
VIT D+METAB SERPL-MCNC: 29 NG/ML (ref 20–50)

## 2024-08-07 PROCEDURE — 82550 ASSAY OF CK (CPK): CPT | Performed by: PATHOLOGY

## 2024-08-07 PROCEDURE — 80076 HEPATIC FUNCTION PANEL: CPT | Performed by: PATHOLOGY

## 2024-08-07 PROCEDURE — 99214 OFFICE O/P EST MOD 30 MIN: CPT | Mod: 25 | Performed by: PHYSICIAN ASSISTANT

## 2024-08-07 PROCEDURE — 82306 VITAMIN D 25 HYDROXY: CPT | Performed by: PHYSICIAN ASSISTANT

## 2024-08-07 PROCEDURE — 99213 OFFICE O/P EST LOW 20 MIN: CPT | Performed by: PHYSICIAN ASSISTANT

## 2024-08-07 PROCEDURE — 87070 CULTURE OTHR SPECIMN AEROBIC: CPT | Performed by: PHYSICIAN ASSISTANT

## 2024-08-07 PROCEDURE — 94375 RESPIRATORY FLOW VOLUME LOOP: CPT | Performed by: PHYSICIAN ASSISTANT

## 2024-08-07 PROCEDURE — 36415 COLL VENOUS BLD VENIPUNCTURE: CPT | Performed by: PATHOLOGY

## 2024-08-07 PROCEDURE — 97803 MED NUTRITION INDIV SUBSEQ: CPT | Performed by: DIETITIAN, REGISTERED

## 2024-08-07 PROCEDURE — 99000 SPECIMEN HANDLING OFFICE-LAB: CPT | Performed by: PATHOLOGY

## 2024-08-07 PROCEDURE — 71046 X-RAY EXAM CHEST 2 VIEWS: CPT | Mod: GC | Performed by: RADIOLOGY

## 2024-08-07 PROCEDURE — 99207 PR NO CHARGE LOS: CPT | Performed by: PHARMACIST

## 2024-08-07 RX ORDER — CHOLECALCIFEROL (VITAMIN D3) 125 MCG
1 CAPSULE ORAL DAILY
Qty: 100 CAPSULE | Refills: 3 | Status: SHIPPED | OUTPATIENT
Start: 2024-08-07

## 2024-08-07 ASSESSMENT — PAIN SCALES - GENERAL: PAINLEVEL: NO PAIN (0)

## 2024-08-07 NOTE — TELEPHONE ENCOUNTER
"Patient Contacted for the patient to call back and schedule the following:    Appointment type: RCF  Provider: Georges Cantu  Return date: 11/14/2024  Specialty phone number: [phone]  Additional appointment(s) needed: adding Georges Visit  Additonal Notes: Per Parvin RN: \"Hi- could you move her pft and logan appt a little later on that same day and schedule with georges at 1000? Thank you- Parvin\"   "

## 2024-08-07 NOTE — TELEPHONE ENCOUNTER
Call to [facility] ([facility] lab, [phone], they perform  the OGTT at their facility, fax [phone]. OGTT orders sent to [facility].  Patient is to call [phone] to schedule.  This RN will update patient.    MH referral entered per request of NAVEED Reyes and HENRY Kirkpatrick, patient aware.    Karla, pharmacist asked for additional lab order to be sent to [facility] lab, order sent.    Parvin Parekh, ROXANAN, RN  RN Care Coordinator [facility]

## 2024-08-07 NOTE — PROGRESS NOTES
Disease State Management Encounter:                          Valencia Casiano is a 19 year old female coming in for a follow-up visit. Today's visit is a co-visit with Roro Kirkpatrick PA-C.     Reason for visit: Medication Question.    CFTR-modulator  Valencia has been on Trikafta since ~. Valencia has a history of bilirubin abnormalities which has required closer monitoring. She continues full dose Trikafta.   Lab Results   Component Value Date    ALT 10 2024    AST 25 2024    BILITOTAL 1.1 2024    DBIL 0.27 2024     (H) 2024     CF Nutrition  Reports she recently started over-the-counter supplement for bloating/GI called Bloom.    Assessment/Plan:    1. Discontinue Bloom supplement due to potential for moderate CY interaction with Trikafta per [facility]. Valencia to discuss with NYA Hanson, for alternative options to manage bloating.   2. Hepatic panel within normal limits, CK slightly elevated. Per discussion with Roro Kirkpatrick PA-C, repeat CK at local clinic in 2 weeks, lab sent by Parvin MANSFIELD. Advised Valencia to stay well hydrated and avoid strenuous exercise prior to lab. Continue full dose Trikafta.    Follow-up: 2 weeks for CK lab    I spent 3 minutes with this patient today. All changes were made via verbal approval with Roro Kirkpatrick PA-C. A copy of the visit note was provided to the patient's provider(s).    A summary of these recommendations was given to the patient (see AVS from today's appointment with Roro Kirkpatrick PA-C).    Karla Amaral, PharmD   Cystic Fibrosis MTM Pharmacist  [facility]       Medication Therapy Recommendations  Cystic fibrosis (H)    Current Medication: elexacaftor-tezacaftor-ivacaftor & ivacaftor (TRIKAFTA) 100-50-75 & 150 MG tablet pack   Rationale: Medication requires monitoring - Needs additional monitoring   Recommendation: Order Lab   Status: Accepted per Provider         Takes dietary supplements    Current Medication:  UNABLE TO FIND (Discontinued)   Rationale: Unsafe medication for the patient - Adverse medication event - Safety   Recommendation: Discontinue Medication   Status: Accepted - no CPA Needed

## 2024-08-07 NOTE — PROGRESS NOTES
"Adult Cystic Fibrosis Program  Annual Psychosocial Assessment    Presenting Information:  Valencia is a 19-year-old female with cystic fibrosis, presenting in CF clinic for a regular follow up with primary CF provider, Roro Kirkpatrick PA-C.  Met with Valencia for first annual psychosocial assessment. Valencia's grandparents were waiting for her in the lobby but no one was present in her clinic room.     Living situation:  Valencia lives with her boyfriend in [place], [place]. They rent this home from Valencia's dad, who lives in a separate home on the same property. They have a mckay retriever, 2 cats, and some chickens and ducks. Valencia denies any concerns about her living situation.      Family Constellation:  Valencia was raised by her biological parents who are no longer . She has 2 sibling(s): a younger sister and brother. Her sister lives with her boyfriend in [place] and her brother is still in high school and lives with their mom in [place]. Valencia is very close with her maternal grandparents who also live in [place]. Valencia is not aware of anyone else in her family who has CF.    Valencia has been dating her boyfriend Jesus for a few years and hopes they will get engaged soon. They are also interested in starting a family in the next couple of years.    Social Support:  Valencia reports good social support. She gets along well with family members and draws additional support from friends, her boyfriend, and co-workers. Neglected to discuss connections in the CF Community today.    Adjustment to Illness:  Valencia was diagnosed with CF around age 1. Her mom told her she was sick all of the time which prompted further testing. Valencia reports few medical complications while growing up but was hospitalized around 4x/year for exacerbations.    Valencia describes her current health status as \"pretty good\". She is on trikafta and reports she has not had a single hospitalization since starting it. Clinically, Valencia has mild lung " disease, pancreatic insufficiency, GI problems, and nutritional problems. She typically does not do vest treatment(s) since starting trikafta. She does not exercise but was interested in the [employer] website which MAYA will send. Valencia denies any health problems that interfere with activities of daily living.     Neglected to discuss openness about CF diagnosis. Will address at at future visit.    Advanced Care Planning:     Health Care Directive:  Valencia has not previously received Health Care Directive education and due to time constraints we did not discuss this today. Her parents are her default decision makers in the absence of a directive. MAYA will discuss this at a future visit.     Education:  Valencia completed high school and a medical assistant degree. She is thinking about going back to school to become a  and is planning to explore this further in the coming months.     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 She reports a supportive work environment and denies any employment concerns. She is benefits eligible for insurance but is not sure about short and long term disability. Encouraged her to explore if this is offered.     Valencia's boyfriend works in [employer].     Finances:  Valencia receives income from wages and denies any major financial concerns. She pays household expenses as able but reports her boyfriend makes more money so takes on more financially.    Insurance:  Valencia is insured by [employer] through her employer. She was previously on medical assistance but is now over income. Because she only has an employer plan she reports her expenses are high, especially when coming to clinic. She will likely qualify for health well and galina care to help reduce costs. MAYA will follow up with resources via email. She denies any other insurance concerns.     Mental Health/Coping:  Valencia reports a history of the following  "mental health concerns: anxiety and depression. She also has health related anxiety which has been worse since starting at the adult center due to logistics and the size of the [facility]. Valencia has been seeing a therapist every 2 weeks for many years and finds this to be beneficial. She took medications for her mood as a child but it made her feel \"like a zombie\". She was interested today in exploring additional psychiatric medication so a referral was placed for CF psychiatry NP- Penelope. She reports her mood has been \"up and down\" which is her baseline. We did not have time to complete the depression and anxiety screen but she denies any SI/plan.     Neglected to discuss spirituality today, will address at a future visit.     Chemical Health:  Valencia vapes daily and is somewhat interested in quitting. She reports that she knows it could have a negative impact on her lungs and is aware that her CF provider would advise against it due to her lung disease. She was receptive to counseling and was open to  sending resources today. She drinks alcohol occasionally in social situations. She does not use illicit drugs.     Leisure Activities/Interests:   Due to time constraints we did not discuss interests/activities today. Will address at a future visit.     Intervention:  -Psychosocial Assessment  -Resource education/referral ([facility], [facility], [address]/beam)  -Supportive counseling/psycho-education  -Motivational interviewing  -Insurance/financial counseling    Assessment:  Valencia was friendly and receptive to SW visit. She appeared to be open in her responses. She has been vaping daily but is in the contemplation stage of change and open to  sending resources. Her mood has been up and down which is her baseline and she see's a therapist, but she was also open to a psychiatry referral today. She was also interested in resources to help with insurance costs and costs of coming to clinic. She reports a stable living " situation and social support. She is able to afford daily living expenses, especially with help from her boyfriend.     Plan:  Continue to follow closely when Valencia comes to clinic.  Re-consult for any psychosocial needs that may arise.    Complete psychosocial assessment annually.  Assist with insurance concerns.  Continue to follow for regular clinic consult.    Amy Heller [facility]  Adult Cystic Fibrosis   Ph: [phone]    Message me securely with Joce TREJO sent the following email to Valencia:    Aldair Nuno for the delay. We talked about a few things during your clinic visit that I was going to follow up on/send you.    Financial assistance/galina care application- Can help cover clinic costs. If you are under 400% of the poverty line you will qualify for some level of assistance (either 50%-100% discount on visits). It's a pretty easy application, let me know if you have questions.   [email or web address]    How are your pharmacy costs? I think we should get you signed up for Health Well yuriy which can help with medication co-pays to help you meet your deductible (up to $15,000 a year). Please respond to this email if you want to apply to this and I will have our pharmacy liaison reach out to enroll you.    Smoking cessation resources:    https://www.[employer]EVERFANS/?gad_source=1&gclid=[email or web address]_BwE    https://www.cdc.gov/tobacco/campaign/tips/index.html?s_cid=OSH_tips_GL0008&utm_source=google&utm_medium=cpc&utm_campaign=TipsRegular+2021%3BS%3BWL%3BBR%3BIMM%3BDTC%3BCO&utm_content=Smoking+-+General_P&utm_term=against+smoking&gad_source=1&gclid=[id number]&gclsrc=aw.ds    I hope this helps! Let me know if you want to enroll in Health Well.    Amy Heller,  LICSW  Adult Cystic Fibrosis

## 2024-08-07 NOTE — LETTER
8/7/2024      Valencia Casiano  [address] [place] [address]      Dear Colleague,    Thank you for referring your patient, Valencia Casiano, to the [facility] [place]. Please see a copy of my visit note below.    [facility]  August 7, 2024         Assessment and Plan:   Valencia Casiano is a 19 year old female with h/o with cystic fibrosis, exocrine pancreatic insufficiency and constipation who is seen today for routine follow up. She was last seen in January.      1. Cystic fibrosis: no new pulmonary complaints, does not exercise or vest unless she is sick. Sating 99% on room air. Per SW, patient has been vaping and PFTs today are slowly trending down from October 2023. Prior cultures + for MRSA from throat cultures, MSSA, Pseudomonas fluorescens, stenotrophomonas, aspergillus (normal vipul on culture from 6/16). No acute issues.  - Nebs and vesting PRN symptoms   - Will need to discuss stopping vaping with the patient     2. CFTR modulation: [id number], on Trikafta and tolerating it well. LFTs today WNL.   - CK added on to labs  - Continue Trikafta     3. Exocrine pancreatic insufficiency:  Abdominal pain and constipation: with repeat fecal elastase on 6/22 < 50. Did not follow up with GI, notes her symptoms have improved and she is no longer having blood in her stool. Has been taking a supplement called Bloom, but review with the pharmacist advises her not to take it.   - Continue enzymes, resume vitamin D  - Vitamin D level pending for today    4. ABPA: prior history, she does not recall if she was on steroids. She is not currently on ICS or steroids. IgE of 55 in January.     5. Family planning: Seen by LALO on 6/11 with detailed plan for pregnancy. Valencia and her boyfriend are planning to hold off for now given her uncertain plans for work or school.     6. Depression/anxiety: has a  therapist. Feels her mood is variable with increased anxiety.  - MAYA Reyes, to refer to Penelope     RTC: 3 months  Annual studies due: January 2024 (overdue for OGTT)  Preventative care: will need updated vaccines     Roro Kirkpatrick PA-C  Pulmonary, Allergy, Critical Care and Sleep Medicine        Interval History:     Working and hanging out with friends. Has not been sick, thinking about going back to school for a . No cough or congestion, Notes some watery eyes and itchy nose with drainage with allergies, taking Zyrtec. No new shortness of breath. Doesn't do airway clearance, hasn't felt sick. No nausea or vomiting, stools are sometimes normal intermixed with runny stools. No fatty or floating stools. No blood. No constipation, didn't follow UP with GI and doesn't plan to at this time.          Review of Systems:   Please see HPI. Otherwise, complete 10 point ROS negative.           Past Medical and Surgical History:     No past medical history on file.  No past surgical history on file.        Family History:     No family history on file.         Social History:     Social History     Socioeconomic History     Marital status: Single     Spouse name: Not on file     Number of children: Not on file     Years of education: Not on file     Highest education level: Not on file   Occupational History     Not on file   Tobacco Use     Smoking status: Unknown     Smokeless tobacco: Not on file   Substance and Sexual Activity     Alcohol use: Not on file     Drug use: Not on file     Sexual activity: Not on file   Other Topics Concern     Not on file   Social History Narrative     Not on file     Social Determinants of Health     Financial Resource Strain: Not on File (12/13/2023)    Received from [facility]    Financial Resource Strain      Financial Resource Strain: 0   Food Insecurity: Not on File (12/13/2023)    Received from [facility]    Food Insecurity      Food: 0   Transportation Needs: Not on File  (2023)    Received from [facility]    Transportation Needs      Transportation: 0   Physical Activity: Not on File (2023)    Received from [facility]    Physical Activity      Physical Activity: 0   Stress: Not on File (2023)    Received from [facility]    Stress      Stress: 0   Social Connections: Not on File (2023)    Received from [facility]    Social Connections      Social Connections and Isolation: 0   Interpersonal Safety: Not on file   Housing Stability: Not on File (2023)    Received from [facility]    Housing Stability      Housin            Medications:     Current Outpatient Medications   Medication Sig Dispense Refill     Cholecalciferol (VITAMIN D) 125 MCG (5000 UT) capsule Take 1 capsule (5,000 Units) by mouth daily 100 capsule 3     elexacaftor-tezacaftor-ivacaftor & ivacaftor (TRIKAFTA) 100-50-75 & 150 MG tablet pack Take 2 orange tablets in the morning and 1 light blue tablet in the evening. Swallow whole with fat-containing food. 84 tablet 5     albuterol (PROAIR HFA/PROVENTIL HFA/VENTOLIN HFA) 108 (90 Base) MCG/ACT inhaler Inhale 1-2 puffs into the lungs every 4 hours as needed for shortness of breath or wheezing (Patient not taking: Reported on 2024) 18 g 3     albuterol (PROVENTIL) (2.5 MG/3ML) 0.083% neb solution Take 1 vial (2.5 mg) by nebulization 4 times daily as needed for exacerbation (Patient not taking: Reported on 2024) 270 mL 3     cetirizine (ZYRTEC) 10 MG tablet Take 1 tablet (10 mg) by mouth daily 90 tablet 3     fluticasone (FLONASE) 50 MCG/ACT nasal spray Spray 2 sprays into both nostrils daily as needed for rhinitis or allergies (Patient not taking: Reported on 2024) 16 g 11     lipase-protease-amylase (CREON) 30068-17157-921559 units CPEP per EC capsule Take 4 capsules by mouth 3 times daily (with meals) and 4 capsules with snacks 3 times daily. 3 snacks/3 meals per day. 720 capsule 11     multivitamin (ONE-DAILY)  "tablet Take 1 tablet by mouth daily 100 tablet 3     polyethylene glycol (MIRALAX) 17 GM/Dose powder Take 17 g by mouth daily as needed for constipation       sodium chloride inhalant 7 % NEBU neb solution Take 4 mLs by nebulization up to four times daily for exacerbations. (Patient not taking: Reported on 1/18/2024) 720 mL 3     SUMAtriptan (IMITREX) 25 MG tablet Take 25 mg by mouth at onset of headache for migraine       No current facility-administered medications for this visit.            Physical Exam:   /74   Pulse 67   Ht 1.5 m (4' 11.06\")   Wt 49.1 kg (108 lb 3.9 oz)   SpO2 99%   BMI 21.82 kg/m      GENERAL: alert, NAD  HEENT: NCAT, EOMI, anicteric sclera, no oral mucosal edema or erythema  Neck: no cervical or supraclavicular adenopathy  Respiratory: good air flow, mainly clear  CV: RRR, S1S2, no murmurs noted  Abdomen: normoactive BS, soft  Lymph: no edema  Neuro: AAO X 3, CN 2-12 grossly intact  Psychiatric: normal affect, good eye contact  Skin: no rash, jaundice or lesions on limited exam         Data:   All laboratory and imaging data reviewed.      Cystic Fibrosis Culture  No results found for: \"SDES\" No results found for: \"CULT\"     PFT interpretation:  Maneuver: valid and meets ATS guidelines  Normal spirometry  FEV1 of 2.82 is 90 ml below prior          Again, thank you for allowing me to participate in the care of your patient.        Sincerely,        Roro Kirkpatrick PA-C  "

## 2024-08-07 NOTE — PATIENT INSTRUCTIONS
"Cystic Fibrosis Self-Care Plan       Patient: Valencia Casiano   MRN: [id number]   Clinic Date: August 7, 2024     RECOMMENDATIONS:  1. Start exercising, 30 minutes 5 days per week or 150 minutes per week of moderate intensity.   2. Restart your vitamin D (should be coming in the mail).   3. Recheck CK in two weeks (hydrate really well before labs).   4. Also get OGTT done locally (can coordinate with CK level) at lab.     Annual Studies:   Immunoglobulin G   Date Value Ref Range Status   01/18/2024 885 610 - 1,616 mg/dL Final     No results found for: \"INS\"  There are no preventive care reminders to display for this patient.    Pulmonary Function Tests  FEV1: amount of air you can blow out in 1 second  FVC: total amount of air you can take in and blow out    Your Goals:             Latest Ref Rng & Units 8/7/2024     8:29 AM   PFT   FVC L 3.30  P   FEV1 L 2.82  P   FVC% % 111  P   FEV1% % 106  P      P Preliminary result          Airway Clearance: The Most Important Way to Keep Your Lungs Healthy  Vest Settings:   Hill-Rom Frequencies: 8, 9, 10 Pressure 10 Then, Frequencies 18, 19, 20 Pressure 6     RespirTech: Quick Start with Pressure of     Do each frequency for 5 minutes; Deflate vest after each frequency & cough 3 times before beginning the next setting.    Vest and Neb Therapy should be done 1 times/day.    Good Nutrition Can Improve Lung Function and Overall Health    Take ALL of your vitamins with food    Take 1/2 of your enzymes before EVERY meal/snack and the other 1/2 mid-meal/snack    Wt Readings from Last 3 Encounters:   08/07/24 49.1 kg (108 lb 3.9 oz)   10/12/23 49.9 kg (110 lb 0.2 oz)       Body mass index is 21.82 kg/m .         National CF Foundation Recommendations for BMI in CF Adults: Women: at least 22 Men: at least 23        Controlling Blood Sugars Helps Prevent Lung Infections & Improves Nutrition  Test blood sugar:    In the morning before eating (goal is )    2 hours after a " meal (goal is less than 150)    When pre-meal glucose is greater than 150 add correction    At bedtime (if less than 100 eat a snack with 15 grams of carbohydrates  Last A1C Results:   Hemoglobin A1C   Date Value Ref Range Status   01/18/2024 5.4 <5.7 % Final     Comment:     Normal <5.7%   Prediabetes 5.7-6.4%    Diabetes 6.5% or higher     Note: Adopted from ADA consensus guidelines.         If diabetic, measure A1C every 6 months. Goal is under 7%.    Staying Healthy  Research: If you are interested in learning about research opportunities or have questions, please contact Sandee Neely at [phone] or [email or web address].     Foundation: Compass is a personalized resource service to help you with the insurance, financial, legal and other issues you are facing.  It's free, confidential and available to anyone with CF.  Ask your  for more information or contact [employer]ass directly at 033-[facility] ([phone]) or compass@cff.org, or learn more at cff.org/compass.       CF Nurse Line: Rahel Hair and KJ: [phone]  Tracy Malagon or Emerald Pedro RT: [phone]    Cele Chowdhury and Margie Smith , Dieticians: [phone]    Kirsty Garvin, Diabetes Nurse: [phone]   Kitty Hernandez: [phone] or Amy Heller at [phone], Social Workers  [email or web address]

## 2024-08-07 NOTE — LETTER
PHYSICIAN ORDERS      DATE & TIME ISSUED: 2024 10:15 AM  PATIENT NAME: Valencia Casiano   : 2005     [facility] MR# [if applicable]:    DIAGNOSIS:  Cystic Fibrosis E84.0  Please conduct an oral glucose tolerance test. See instructions below:     2-Hour Oral Glucose Tolerance Test  Patient must be NPO for 8 hours for this test  Test may need to be terminated if patient exhibits signs of hypoglycemia   Test the blood sugar with glucometer before terminating the test    Discontinue test if at any time glucose is less than 60 mg/dL, give snack     Steps:  1. Draw fasting glucose   2. Give 75 g of oral glucose. Glucose solution should be drank within 10 minutes of starting the solution.   3. Draw glucose levels at times 30, 60, 90, and 120 minutes. If unable to draw every 30 minutes, please draw at 60 and 120 minutes but preferably every 30 minutes for our test.   4. Please give snack at the end of test. 1 cup of milk, 1 cup juice or 2 packages of betsy crackers  5. OK to discharge if blood sugar is >/=80.       Any questions please call: [phone]    Please fax these results to [phone].        Roro Kirkpatrick PA-C

## 2024-08-07 NOTE — PATIENT INSTRUCTIONS
See provider AVS for a summary of recommendations from today's visit.  Karla Amaral, PharmD  Cystic Fibrosis MTM Pharmacist  [facility]

## 2024-08-07 NOTE — LETTER
PHYSICIAN ORDERS      DATE & TIME ISSUED: 2024 10:52 AM  PATIENT NAME: Valencia Casiano   : 2005     [facility] MR# [if applicable]:     DIAGNOSIS:  Cystic Fibrosis E84.0    Please draw/collect the following lab in 2 weeks:   -CK Total    Any questions please call: [phone]    Please fax these results to [phone].        Roro Kirkpatrick PA-C

## 2024-08-07 NOTE — NURSING NOTE
"Valencia Casiano is a 19 year old year old who is being seen for Cystic Fibrosis (CF follow up )      Medications reviewed and Vital signs taken.    Specimen Collection Type: Throat Swab    Order(s) placed: CF Aerobic Bacterial    *IF AFB order placed - please enter \"PRIORITIZE AFB\" to order comments.       No results found for: \"ACIDFAST\"      No results found for: \"AFBSMS\"    Vitals were taken and medications were reconciled.    Emmy Oviedo RMA  9:33 AM      "

## 2024-08-08 ENCOUNTER — TELEPHONE (OUTPATIENT)
Dept: PULMONOLOGY | Facility: CLINIC | Age: 19
End: 2024-08-08
Payer: COMMERCIAL

## 2024-08-08 ENCOUNTER — PRE VISIT (OUTPATIENT)
Dept: PSYCHIATRY | Facility: CLINIC | Age: 19
End: 2024-08-08
Payer: COMMERCIAL

## 2024-08-08 DIAGNOSIS — E84.9 CF (CYSTIC FIBROSIS) (H): Primary | ICD-10-CM

## 2024-08-08 RX ORDER — ALBUTEROL SULFATE 0.83 MG/ML
2.5 SOLUTION RESPIRATORY (INHALATION) 4 TIMES DAILY
Qty: 270 ML | Refills: 3 | Status: SHIPPED | OUTPATIENT
Start: 2024-08-08

## 2024-08-08 NOTE — TELEPHONE ENCOUNTER
Call to Vishnu Abad, patient can complete follow-up CXR at clinic, fax to imaging is [phone]. Orders faxed.    Emerald RT is reaching out to patient to discuss follow-up recommendations based on CXR done yesterday.    Parvin Parekh, BSN, RN  RN Care Coordinator Cystic Fibrosis Adult Clinic

## 2024-08-08 NOTE — TELEPHONE ENCOUNTER
HENRY Kirkpatrick would like patient to restart nebs and vesting.    Orders placed for Albuterol and Pulmozyme. Emerald RT updated patient.    Parvin Parekh BSN, RN  RN Care Coordinator Cystic Fibrosis Adult Clinic

## 2024-08-08 NOTE — TELEPHONE ENCOUNTER
"PSYCHIATRY CLINIC PHONE INTAKE     SERVICES REQUESTED / INTERESTED IN          Med Management - CF pt for carlos eduardo    Presenting Problem and Brief History                              What would you like to be seen for? (brief description):    Has been treated before for anxiety/depression, seeing a therapist for this now. Lopez  \"I feel like I'm kind of up and down\" patient wants to address this      Have you received a mental health diagnosis? Yes   Which one (s): anxiety, depression  Is there any history of developmental delay?  No   Are you currently seeing a mental health provider?  Yes            Who / month last seen:  therapistlopez  Do you have mental health records elsewhere?  Yes  Will you sign a release so we can obtain them?  Yes    Have you ever been hospitalized for psychiatric reasons?  No  Describe:  \"almost\" more than 5 years ago, trip to emergency room    Do you have current thoughts of self-harm?  No    Do you currently have thoughts of harming others?  No    Do you have any safety concerns? No   If yes to these, offer to reach out to a  for follow up.      Substance Use History     Do you have any history of alcohol or other substance use?  No  Describe:  na  Have you ever received treatment for this?  No    Describe:  na     Social History     Who is the patient's a guardian?   self     Name / number: self  Have you had an ACT team in last 12 months?  No  Describe: na   OK to leave a detailed voicemail?  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    Do you currently take 5 or more medications, including prescriptions, supplements, and other over the counter products?      If YES to at least one of these questions:   As part of your evaluation in our clinic, we have specially trained pharmacists as part of your care team. Your provider would like for you to meet with one of our pharmacists to review your current and past medications, ensure your med list is up to date, and queue up any questions or concerns you have about medications. They will review all of your medications, not just for mental health, to help ensure you know what you re taking and that everything is working together.     Please schedule patient in [facility] PSYCHIATRY (Alisha Foster or Mary Fox) for 60m MTM in any green space as virtual (video), telephone, or in person (designated in person days per Epic templates).  -Appt notes can say  Psych eval on xx/xx   -Route telephone encounter to the pharmacist who will be seeing the patient.  If patient has questions about insurance coverage or billing, please still schedule the visit and refer them to call the [facility] coordinators at [phone].    Current Outpatient Medications   Medication Sig Dispense Refill    albuterol (PROAIR HFA/PROVENTIL HFA/VENTOLIN HFA) 108 (90 Base) MCG/ACT inhaler Inhale 1-2 puffs into the lungs every 4 hours as needed for shortness of breath or wheezing (Patient not taking: Reported on 1/18/2024) 18 g 3    albuterol (PROVENTIL) (2.5 MG/3ML) 0.083% neb solution Take 1 vial (2.5 mg) by nebulization 4 times daily as needed for exacerbation (Patient not taking: Reported on 1/18/2024) 270 mL 3    cetirizine (ZYRTEC) 10 MG tablet Take 1 tablet (10 mg) by mouth daily 90 tablet 3    Cholecalciferol (VITAMIN D) 125 MCG (5000 UT) capsule Take 1 capsule (5,000 Units) by mouth daily 100 capsule 3    elexacaftor-tezacaftor-ivacaftor &  ivacaftor (TRIKAFTA) 100-50-75 & 150 MG tablet pack Take 2 orange tablets in the morning and 1 light blue tablet in the evening. Swallow whole with fat-containing food. 84 tablet 5    fluticasone (FLONASE) 50 MCG/ACT nasal spray Spray 2 sprays into both nostrils daily as needed for rhinitis or allergies (Patient not taking: Reported on 1/18/2024) 16 g 11    lipase-protease-amylase (CREON) 62292-45364-386941 units CPEP per EC capsule Take 4 capsules by mouth 3 times daily (with meals) and 4 capsules with snacks 3 times daily. 3 snacks/3 meals per day. 720 capsule 11    multivitamin (ONE-DAILY) tablet Take 1 tablet by mouth daily 100 tablet 3    polyethylene glycol (MIRALAX) 17 GM/Dose powder Take 17 g by mouth daily as needed for constipation      sodium chloride inhalant 7 % NEBU neb solution Take 4 mLs by nebulization up to four times daily for exacerbations. (Patient not taking: Reported on 1/18/2024) 720 mL 3    SUMAtriptan (IMITREX) 25 MG tablet Take 25 mg by mouth at onset of headache for migraine           DISPOSITION      Phone screen completed with patient. Scheduled for MTM 8/21 and new psych specialty with Penelope Ibrahim 9/12. Emailed and mailed new patient packet    Complex  Cindy la

## 2024-08-08 NOTE — LETTER
PHYSICIAN ORDERS      DATE & TIME ISSUED: 2024 1:26 PM  PATIENT NAME: Valencia Casiano   : 2005     [facility] MR# [if applicable]:   DIAGNOSIS:  Cystic Fibrosis E84.0    Please perform 2 VIEW Chest X-ray, sometime the week of 2024.    Please PUSH images to [place] PACS.    Any questions please call: [phone]    Please fax test results to [phone].        Roro Kirkpatrick PA-C

## 2024-08-08 NOTE — PROGRESS NOTES
"CF Annual Nutrition Assessment    Reason for Assessment  Assessed during Roro Kirkpatrick PA-C Clinic r/t increased nutrition risk with diagnosis of CF per protocol.     Nutrition Significant PMH  Mild Lung Disease - taking Trikafta  Pancreatic Insufficient  Hx insulin with steroids per chart  Family Planning- evaluated by MFM in ; no plans to pursue pregnancy currently per provider    Anthropometric Assessment  Height: 150 cm  Weight: 49.9 kg (110 lbs)  IBW based on BMI 22 for females and 23 for males per CF Foundation recs: 49.8 kg (110 lbs)  Body mass index is 21.82 kg/m .     Wt Readings from Last 5 Encounters:   24 49.1 kg (108 lb 3.9 oz)   10/12/23 49.9 kg (110 lb 0.2 oz)     History of difficulty gaining weight in childhood. Just at/slightly below BMI per CFF; remains stable.     Pancreatic Enzymes  Repeat fecal elastase <50 on 23 at local clinic - confirms severe EPI.   Brand:  Creon [id number]  Dosin with meals and snacks = 1920 units lipase/kg/meal  Estimated Daily Intake: 16/day  *may skip enzymes with snacks    GI Comments: chronic constipation, bloating, and abdominal pain. Worsened with Trikafta. Recently required outpatient Miralax/Golytely clean-outs for stool burden on AXR without success, needed gastrograffin enema.    -- Not currently taking Miralax, feels that it is not effective. Last evaluated by GI 10/12/23.    -- Added [place] Greens to help with bloating (most bothersome GI symptom) and felt that this helped. Agreed to discontinue given concerns with elevated labs, potential interactions with Trikafta.     Enzyme Program: not eligible     Diet History and Assessment  Diet Preferences/Allergies/Intolerances: Regular  Intake Recall/Comments: Says she does more snacks than big meals. Skips breakfast. For lunch goes to a friend's house and has freezer/convenience foods like pizza rolls, cheese curds or leftovers. Dinner is \"a lot of meat\" with sides like rice/veggies when boyfriend is " "home and cooks. MARIA DOLORES and Valencia share grocery shopping - often [employer] or [employer]. He travels 1/2 each week for work so she may have cereal with 2% milk when on her own. Snacks are \"anything\"  - chips, granola bars, crackers, Balanced Breaks packs, cheese, etc. Wondering today about some healthier snack options - \"doesn't want to just sit and eat chips.\"     Calcium: lots of cheese, milk with cereal  Salt: likely adequate from foods  Hydration: water, apple juice, some milk; may have soda if available 1-2 cans/day (Mt Dew, Elsy)  Supplements:  none    Estimated Energy and Protein Needs  BEE: 1320  3798-9582 kcals/day = 150-175% BEE  60-75 g protein/day = 1.2-1.5 g/kg    Laboratory Assessment  Date: 1/18/24  Vitamin A- 0.58 wnl  Vitamin D- 17 low --> 29, improved today  Vitamin E- 5 - appears low, however wnl when corrected for lipids  Iron- 130 wnl  Lipid Panel- wnl    OGTT- not yet completed; orders today to complete locally  DEXA- 2024, lowest z-score 0.7 wnl    Current Vitamin/Mineral Prescription: Vitamin D 125 mcg, MVI  Comments: [facility]    Nutrition Diagnosis  Impaired nutrient utilization related pancreatic insufficiency with CF as evidenced by requires high kcal/pro diet, vitamin/mineral supplementation, and pancreatic enzyme replacement in order to maintain nutrition and health status.     Interventions/Recommendations  1) PO Intake/Weight: Discussed current nutrition status and goals. Pt requesting a list of snack ideas since she tends to mostly graze throughout the day. Discussed trying to incorporate balance with protein and healthy fats in her snacks - provided list via [employer] per pt request following visit. Pt also wondering about some beverage options as alternatives to Mountain Dew.     2) Vitamin/Mineral Supplementation: continue current supplementation recommendations with MVI and Vit D.     3) GI/Enzymes: felt improvement in GI symptoms of chronic bloating, constipation when using the " Bloom Greens supplement, however concerns with abnormal labs with extensive ingredients list with recommendation to discontinue (per pharmD). Noted that supplement contains fiber - discussed that pt could try a fiber supplement to see if this helps with constipation. If no improvement within 1-2 weeks, would discontinue. Encouraged pt to follow-up with CF GI - coordinate appointment with next follow-up visit.       GOALS:  1) Maintain BMI >22 kg/m2  2) Vitamin levels wnl  3) Take enzymes with all meals/snacks    FOLLOW-UP/MONITORING:  Visit patient within 6-12 month(s) or sooner per pt/MD request; closer follow-up with recent transition to adult clinic.     Time Spent In Face-to-Face Patient Interactions: 15 minutes    Margie Smith RD, LD, CACFD  Cystic Fibrosis/Lung Transplant Dietitian  Pager [phone]

## 2024-08-09 ENCOUNTER — TELEPHONE (OUTPATIENT)
Dept: PULMONOLOGY | Facility: CLINIC | Age: 19
End: 2024-08-09

## 2024-08-09 LAB
EXPTIME-PRE: 4.5 SEC
FEF2575-%PRED-PRE: 87 %
FEF2575-PRE: 2.92 L/SEC
FEF2575-PRED: 3.34 L/SEC
FEFMAX-%PRED-PRE: 105 %
FEFMAX-PRE: 6.51 L/SEC
FEFMAX-PRED: 6.16 L/SEC
FEV1-%PRED-PRE: 106 %
FEV1-PRE: 2.82 L
FEV1FEV6-PRE: 86 %
FEV1FEV6-PRED: 87 %
FEV1FVC-PRE: 86 %
FEV1FVC-PRED: 90 %
FIFMAX-PRE: 5.06 L/SEC
FVC-%PRED-PRE: 111 %
FVC-PRE: 3.3 L
FVC-PRED: 2.95 L

## 2024-08-09 NOTE — TELEPHONE ENCOUNTER
PA Initiation    Medication: PULMOZYME 2.5 MG/2.5ML IN SOLN  Insurance Company: [employer] - Phone [phone] Fax [phone]  Pharmacy Filling the Rx:    Filling Pharmacy Phone:    Filling Pharmacy Fax:    Start Date: 8/9/2024    Key: [id number]

## 2024-08-12 LAB — BACTERIA SPEC CULT: NORMAL

## 2024-08-15 NOTE — TELEPHONE ENCOUNTER
Prior Authorization Approval    Medication: PULMOZYME 2.5 MG/2.5ML IN SOLN  Authorization Effective Date: 7/9/2024  Authorization Expiration Date: 8/9/2025  Approved Dose/Quantity: 75ml for 30 ds   Reference #: Key: [id number]   Insurance Company: [employer] - Phone [phone] Fax [phone]  Expected CoPay: $    CoPay Card Available:      Financial Assistance Needed:   Which Pharmacy is filling the prescription: [facility] - [place], [place] - [address]   Pharmacy Notified:   Patient Notified:     Sent mychart with info on approval and copay card [email or web address]

## 2024-08-19 ENCOUNTER — MYC MEDICAL ADVICE (OUTPATIENT)
Dept: PULMONOLOGY | Facility: CLINIC | Age: 19
End: 2024-08-19
Payer: COMMERCIAL

## 2024-08-20 ENCOUNTER — CLINICAL UPDATE (OUTPATIENT)
Dept: PHARMACY | Facility: CLINIC | Age: 19
End: 2024-08-20
Payer: COMMERCIAL

## 2024-08-20 ENCOUNTER — TELEPHONE (OUTPATIENT)
Dept: NUTRITION | Facility: CLINIC | Age: 19
End: 2024-08-20

## 2024-08-20 DIAGNOSIS — E84.9 CF (CYSTIC FIBROSIS) (H): Primary | ICD-10-CM

## 2024-08-20 PROCEDURE — 99207 PR NO CHARGE LOS: CPT | Performed by: PHARMACIST

## 2024-08-20 NOTE — PROGRESS NOTES
Clinical Update:                                                    A chart review was conducted for Valencia Casiano.    Reason for Chart Review: Trikafta CK Lab Monitoring     Discussion: Valencia has been on Trikafta since ~2020. Valencia has a history of bilirubin abnormalities and CK elevations, which has required closer monitoring. Per chart review, patient continues full dose Trikafta .     Labs were reviewed from 8/20/2024 at  [facility] . CK has returned to normal limits. Previously, total bilirubin was slightly elevated, majority of bilirubin elevation is indirect bilirubin which can be caused by WBCB0S4/3 inhibition by elexacaftor and does not require intervention.         Plan:  1. Continue Trikafta   2. Recheck hepatic panel and CK in 6 months         Karla Amaral, PharmD   Cystic Fibrosis MTM Pharmacist  [facility]

## 2024-08-20 NOTE — PROGRESS NOTES
"Nutrition Note    Spoke with pt via phone call to review results of recent OGTT performed at local clinic 8/8/24. Results available via [employer] Everywhere. Fasting 94, 146 at 1-hr, and 63 (low) at 2-hr demonstrating reactive hypoglycemia. Pt reports feeling symptomatic, \"exhausted and dizzy\" at the end of the test. Acknowledges that she has felt symptoms of hypoglycemia in the past.     Interventions/Recommendations:  Reviewed dietary management for hypoglycemia including limiting consumption of simple carbohydrates, eating balanced meals and snacks with protein/fat/complex carbohydrates, and avoiding periods of prolonged fasting. Encouraged pt to carry carbohydrate source in case she needs to treat a low blood sugar. Valencia will monitor for symptoms at home and contact CF team if this is happening more often despite these interventions.       Margie Smith RD, LD, CACFD  Cystic Fibrosis/Lung Transplant Dietitian  Available via [employer]      "

## 2024-08-21 ENCOUNTER — VIRTUAL VISIT (OUTPATIENT)
Dept: PHARMACY | Facility: CLINIC | Age: 19
End: 2024-08-21
Payer: COMMERCIAL

## 2024-08-21 DIAGNOSIS — E84.9 CF (CYSTIC FIBROSIS) (H): Primary | ICD-10-CM

## 2024-08-21 DIAGNOSIS — F39 MOOD DISORDER (H): ICD-10-CM

## 2024-08-21 PROCEDURE — 99605 MTMS BY PHARM NP 15 MIN: CPT | Mod: 95 | Performed by: PHARMACIST

## 2024-08-21 RX ORDER — BIOTIN 10000 MCG
1 CAPSULE ORAL DAILY
COMMUNITY

## 2024-08-21 NOTE — Clinical Note
Hello,   Just sending as an FYI that I completed an MTM/med review on this new intake patient you are scheduled to see in the future.   Thank you!  Alisha Foster, PharmD, BCPP Medication Therapy Management Pharmacist [facility]

## 2024-08-21 NOTE — PROGRESS NOTES
Medication Therapy Management (MTM) Encounter    ASSESSMENT:                            Medication Adherence/Access: No issues identified    Mental Health   Patient with reported symptoms of depression and anxiety, will be seeing psychiatry provider in about 1 month for further assessment.  She has not been on antidepressant consistently in about 5 years.  Has a remote trial of Lexapro which caused some blunting and a more recent trial of Effexor 37.5 mg daily which was thought to have caused pupillary dilation 3 days after starting it (see ER visit at [facility] 5/11/2023 for additional details).  She is open to considering trying an alternate antidepressant if indicated based on psychiatry provider assessment. Selective serotonin reuptake inhibitor trial may be a reasonable consideration.    CF: Continue management with CF clinic    PLAN:                            Medication list reviewed and updated today.   No medication changes recommended at this time; see psychiatry provider for further assessment    Follow-up: MTM as requested    SUBJECTIVE/OBJECTIVE:                          Valencia Casiano is a 19 year old female seen for an initial visit. She was referred to me from psychiatry intake.      Reason for visit: new patient medication review.    Allergies/ADRs: Reviewed in chart  Past Medical History: Reviewed in chart  Tobacco: She has no history on file for tobacco use.  Alcohol: did not discuss    Medication Adherence/Access:     Mental Health   No current medications  Valencia is seen today for MTM as part of [facility] new intake/evaluation. Patient is scheduled to see a psychiatry prescriber on 9/12/24 for psychiatry intake/diagnostic assessment.  Psychiatry medications are currently prescribed by none. Valencia reports past psychiatric diagnoses of depression, anxiety. Medical comorbidities include: CF, migraines.     Today, Valencia reports hoping for improvement in anxiety and  depression. Hasn't been on psychiatry medication consistently in about 5 years. States medication - Lexapro - was initially started at age 12 and she took them for a while but didn't like the way they made her feel - felt numb, zombie-like. Wasn't on a medication for about 5 years, then most recently tried an antidepressant about 1 year ago. She doesn't recall the name but states it caused dilated pupils and the sensation of floating which occurred 3 days after starting it and it was therefore discontinued. PCP at [facility] in [place] prescribed it.         CF:   Trikafta, albuterol, Pulmozyme, sodium chloride neb, Creon, multivitamin, vitamin D.  Managed by CF clinic at [facility].      ----------------      I spent 15 minutes with this patient today. A copy of the visit note was provided to the patient's provider(s).    A summary of these recommendations was sent via [facility].    Alisha Foster, PharmD, BCPP  Medication Therapy Management Pharmacist  [facility]      Telemedicine Visit Details  Type of service:  Video Conference via [employer]  Start Time:  110p  End Time:  125p     Medication Therapy Recommendations  No medication therapy recommendations to display

## 2024-08-21 NOTE — PATIENT INSTRUCTIONS
"Recommendations from today's MTM visit:                                                      Medication list reviewed and updated today.   No medication changes recommended at this time; see psychiatry provider for further assessment    Follow-up: MTM as requested    It was great speaking with you today.  I value your experience and would be very thankful for your time in providing feedback in our clinic survey. In the next few days, you may receive an email or text message from [facility] [employer] with a link to a survey related to your  clinical pharmacist.\"     To schedule another MTM appointment, please call the clinic directly or you may call the MTM scheduling line at [phone] or toll-free at [phone].     My Clinical Pharmacist's contact information:                                                      Please feel free to contact me with any questions or concerns you have.      Alisha Foster, PharmD, BCPP  Medication Therapy Management Pharmacist  [facility]    "
Name band;

## 2024-08-28 ENCOUNTER — TELEPHONE (OUTPATIENT)
Dept: PULMONOLOGY | Facility: CLINIC | Age: 19
End: 2024-08-28
Payer: COMMERCIAL

## 2024-08-28 NOTE — TELEPHONE ENCOUNTER
STEFFANY APPROVED    Medication: MULTIVITAMINS PO  Amount: $ 1,000  Foundation Name: [facility] Vitamin/Supplemental steffany  Foundation Effective Date: 7/16/2024  Foundation Expiration Date: 7/15/2025       PAST SURGICAL HISTORY:  Hernia, inguinal, bilateral     History of squamous cell carcinoma scalp , moh's procedure 2013

## 2024-08-28 NOTE — TELEPHONE ENCOUNTER
STEFFANY APPROVED    Medication: PULMOZYME 2.5 MG/2.5ML IN SOLN  Amount: $ 15,000  [facility] Name: [facility] CF treatment [facility] Effective Date: 7/16/2024  Foundation Expiration Date: 7/15/2025

## 2024-09-12 ENCOUNTER — VIRTUAL VISIT (OUTPATIENT)
Dept: PSYCHIATRY | Facility: CLINIC | Age: 19
End: 2024-09-12
Payer: COMMERCIAL

## 2024-09-12 VITALS — HEIGHT: 59 IN | WEIGHT: 110 LBS | BODY MASS INDEX: 22.18 KG/M2

## 2024-09-12 DIAGNOSIS — F33.1 MDD (MAJOR DEPRESSIVE DISORDER), RECURRENT EPISODE, MODERATE (H): ICD-10-CM

## 2024-09-12 DIAGNOSIS — F41.1 GAD (GENERALIZED ANXIETY DISORDER): Primary | ICD-10-CM

## 2024-09-12 PROCEDURE — 99205 OFFICE O/P NEW HI 60 MIN: CPT | Mod: 95

## 2024-09-12 PROCEDURE — 99417 PROLNG OP E/M EACH 15 MIN: CPT | Mod: 95

## 2024-09-12 PROCEDURE — G2211 COMPLEX E/M VISIT ADD ON: HCPCS | Mod: 95

## 2024-09-12 RX ORDER — SERTRALINE HYDROCHLORIDE 25 MG/1
TABLET, FILM COATED ORAL
Qty: 46 TABLET | Refills: 0 | Status: SHIPPED | OUTPATIENT
Start: 2024-09-12 | End: 2024-10-12

## 2024-09-12 ASSESSMENT — PAIN SCALES - GENERAL: PAINLEVEL: NO PAIN (0)

## 2024-09-12 ASSESSMENT — PATIENT HEALTH QUESTIONNAIRE - PHQ9
10. IF YOU CHECKED OFF ANY PROBLEMS, HOW DIFFICULT HAVE THESE PROBLEMS MADE IT FOR YOU TO DO YOUR WORK, TAKE CARE OF THINGS AT HOME, OR GET ALONG WITH OTHER PEOPLE: VERY DIFFICULT
SUM OF ALL RESPONSES TO PHQ QUESTIONS 1-9: 9
SUM OF ALL RESPONSES TO PHQ QUESTIONS 1-9: 9

## 2024-09-12 ASSESSMENT — ANXIETY QUESTIONNAIRES
7. FEELING AFRAID AS IF SOMETHING AWFUL MIGHT HAPPEN: SEVERAL DAYS
GAD7 TOTAL SCORE: 11
8. IF YOU CHECKED OFF ANY PROBLEMS, HOW DIFFICULT HAVE THESE MADE IT FOR YOU TO DO YOUR WORK, TAKE CARE OF THINGS AT HOME, OR GET ALONG WITH OTHER PEOPLE?: VERY DIFFICULT
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
## 2024-09-12 NOTE — PROGRESS NOTES
Virtual Visit Details    Type of service:  Video Visit     Originating Location (pt. Location): Parked car    Distant Location (provider location):  Off-site  Platform used for Video Visit: Evans

## 2024-09-12 NOTE — NURSING NOTE
Current patient location: [id number] [place] [address] [place] [address]    Is the patient currently in the state of [place]? YES    Visit mode:VIDEO    If the visit is dropped, the patient can be reconnected by: VIDEO VISIT: Text to cell phone:   Telephone Information:   Mobile [phone]       Will anyone else be joining the visit? NO  (If patient encounters technical issues they should call [phone]    How would you like to obtain your AVS? MyChart    Are changes needed to the allergy or medication list? No    Are refills needed on medications prescribed by this physician? YES    Rooming Documentation:  Unable to complete questionnaire(s) due to time      Reason for visit: Consult    Jose PURCELL

## 2024-09-12 NOTE — PATIENT INSTRUCTIONS
Medication Plan  -Start Zoloft 25mg once daily for 2 weeks, THEN increase to 50mg daily    **For crisis resources, please see the information at the end of this document**   Patient Education    Thank you for coming to the [facility] [place] [facility].     Lab Testing:  If you had lab testing today and your results are reassuring or normal they will be mailed to you or sent through MobFox within 7 days. If the lab tests need quick action we will call you with the results. The phone number we will call with results is # [phone]. If this is not the best number please call our clinic and change the number.     Medication Refills:  If you need any refills please call your pharmacy and they will contact us. Our fax number for refills is [phone].   Three business days of notice are needed for general medication refill requests.   Five business days of notice are needed for controlled substance refill requests.   If you need to change to a different pharmacy, please contact the new pharmacy directly. The new pharmacy will help you get your medications transferred.     Contact Us:  Please call [phone] during business hours (8-5:00 M-F).   If you have medication related questions after clinic hours, or on the weekend, please call [phone].     Financial Assistance [phone]   Medical Records [phone]       MENTAL HEALTH CRISIS RESOURCES:  For a emergency help, please call 911 or go to the nearest Emergency Department.     Emergency Walk-In Options:   EmPATH Unit @ [facility] ([place]): [phone] - Specialized mental health emergency area designed to be calming  [facility] ([place]): [phone]  [facility] ([place]): [phone]  [facility]): [phone]    [facility] Crisis Information:   [place]: [phone]  [place]: [phone]  [place] ([place]ARJ[place]) - Adult: [phone]     Child:  [phone]  [place] - Adult: [phone]     Child: [phone]  [place]: [phone]  List of all [facility] resources:   https://[place].gov/dhs/people-we-serve/adults/health-care/mental-health/resources/crisis-contacts.jsp    National Crisis Information:   Crisis Text Line: Text  [place]  to 982862  Suicide & Crisis Lifeline: 988  National Suicide Prevention Lifeline: 0-182-125-TALK (1-902.982.1058)       For online chat options, visit https://suicidepreventionlifeline.org/chat/  Poison Control Center: 6-146-262-9033  Trans Lifeline: 1-183.170.8615 - Hotline for transgender people of all ages  The [place] Project: 9-501-201-7127 - Hotline for LGBT youth     For Non-Emergency Support:   Fast Tracker: Mental Health & Substance Use Disorder Resources -   https://www.Market76ckVictoriousn.org/

## 2024-09-29 ENCOUNTER — HEALTH MAINTENANCE LETTER (OUTPATIENT)
Age: 19
End: 2024-09-29

## 2024-10-11 ENCOUNTER — VIRTUAL VISIT (OUTPATIENT)
Dept: PSYCHIATRY | Facility: CLINIC | Age: 19
End: 2024-10-11
Payer: COMMERCIAL

## 2024-10-11 VITALS — BODY MASS INDEX: 22.78 KG/M2 | WEIGHT: 113 LBS | HEIGHT: 59 IN

## 2024-10-11 DIAGNOSIS — F33.1 MDD (MAJOR DEPRESSIVE DISORDER), RECURRENT EPISODE, MODERATE (H): ICD-10-CM

## 2024-10-11 DIAGNOSIS — F41.1 GAD (GENERALIZED ANXIETY DISORDER): ICD-10-CM

## 2024-10-11 PROCEDURE — G2211 COMPLEX E/M VISIT ADD ON: HCPCS | Mod: 95

## 2024-10-11 PROCEDURE — 99213 OFFICE O/P EST LOW 20 MIN: CPT | Mod: 95

## 2024-10-11 RX ORDER — SERTRALINE HYDROCHLORIDE 25 MG/1
50 TABLET, FILM COATED ORAL DAILY
Qty: 30 TABLET | Refills: 1 | Status: SHIPPED | OUTPATIENT
Start: 2024-10-11

## 2024-10-11 ASSESSMENT — PATIENT HEALTH QUESTIONNAIRE - PHQ9
SUM OF ALL RESPONSES TO PHQ QUESTIONS 1-9: 9
10. IF YOU CHECKED OFF ANY PROBLEMS, HOW DIFFICULT HAVE THESE PROBLEMS MADE IT FOR YOU TO DO YOUR WORK, TAKE CARE OF THINGS AT HOME, OR GET ALONG WITH OTHER PEOPLE: SOMEWHAT DIFFICULT
SUM OF ALL RESPONSES TO PHQ QUESTIONS 1-9: 9

## 2024-10-11 ASSESSMENT — PAIN SCALES - GENERAL: PAINLEVEL: NO PAIN (0)

## 2024-10-11 NOTE — PATIENT INSTRUCTIONS
Medication Plan  -Continue Zoloft 50mg daily    **For crisis resources, please see the information at the end of this document**   Patient Education    Thank you for coming to the [facility] [place] [facility].     Lab Testing:  If you had lab testing today and your results are reassuring or normal they will be mailed to you or sent through TheSquareFoot within 7 days. If the lab tests need quick action we will call you with the results. The phone number we will call with results is # [phone]. If this is not the best number please call our clinic and change the number.     Medication Refills:  If you need any refills please call your pharmacy and they will contact us. Our fax number for refills is [phone].   Three business days of notice are needed for general medication refill requests.   Five business days of notice are needed for controlled substance refill requests.   If you need to change to a different pharmacy, please contact the new pharmacy directly. The new pharmacy will help you get your medications transferred.     Contact Us:  Please call [phone] during business hours (8-5:00 M-F).   If you have medication related questions after clinic hours, or on the weekend, please call [phone].     Financial Assistance [phone]   Medical Records [phone]       MENTAL HEALTH CRISIS RESOURCES:  For a emergency help, please call 421 or go to the nearest Emergency Department.     Emergency Walk-In Options:   EmPATH Unit @ [facility] ([place]): [phone] - Specialized mental health emergency area designed to be calming  [facility] ([place]): [phone]  [facility] ([place]): [phone]  [facility]): [phone]    [facility] Crisis Information:   [place]: [phone]  [place]: [phone]  [place] (AMARJIT) - Adult: [phone]     Child: [phone]  [place] - Adult: [phone]      [facility]: [phone]  [place]: [phone]  List of all [facility] resources:   https://[place].gov/dhs/people-we-serve/adults/health-care/mental-health/resources/crisis-contacts.jsp    National Crisis Information:   Crisis Text Line: Text  [place]  to 128029  Suicide & Crisis Lifeline: 988  National Suicide Prevention Lifeline: 5-812-253-TALK (1-105.186.1804)       For online chat options, visit https://suicidepreventionlifeline.org/chat/  Poison Control Center: 5-992-176-9418  Trans Lifeline: 1-968.621.1813 - Hotline for transgender people of all ages  The [employer] Project: 1-814-619-9792 - Hotline for LGBT youth     For Non-Emergency Support:   Fast Tracker: Mental Health & Substance Use Disorder Resources -   https://www.NetStreamstrackHochy eton.org/
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
## 2024-10-11 NOTE — NURSING NOTE
Current patient location:  At work in parked car    Is the patient currently in the state of [place]? YES    Visit mode:VIDEO    If the visit is dropped, the patient can be reconnected by: VIDEO VISIT: Text to cell phone:   Telephone Information:   Mobile [phone]       Will anyone else be joining the visit? NO  (If patient encounters technical issues they should call [phone]    Are changes needed to the allergy or medication list? No    Are refills needed on medications prescribed by this physician? NO    Rooming Documentation:  Questionnaire(s) completed    Reason for visit: HAM MUELLERF

## 2024-11-19 ENCOUNTER — MYC REFILL (OUTPATIENT)
Dept: PULMONOLOGY | Facility: CLINIC | Age: 19
End: 2024-11-19
Payer: COMMERCIAL

## 2024-11-19 DIAGNOSIS — J30.1 SEASONAL ALLERGIC RHINITIS DUE TO POLLEN: ICD-10-CM

## 2024-11-19 DIAGNOSIS — K86.89 PANCREATIC INSUFFICIENCY: ICD-10-CM

## 2024-11-19 DIAGNOSIS — E84.9 CF (CYSTIC FIBROSIS) (H): ICD-10-CM

## 2024-11-19 RX ORDER — CETIRIZINE HYDROCHLORIDE 10 MG/1
10 TABLET ORAL DAILY
Qty: 90 TABLET | Refills: 3 | Status: SHIPPED | OUTPATIENT
Start: 2024-11-19

## 2024-11-19 RX ORDER — MULTIVITAMIN
1 TABLET ORAL DAILY
Qty: 100 TABLET | Refills: 3 | Status: SHIPPED | OUTPATIENT
Start: 2024-11-19

## 2024-12-02 ENCOUNTER — TELEPHONE (OUTPATIENT)
Dept: PULMONOLOGY | Facility: CLINIC | Age: 19
End: 2024-12-02

## 2024-12-02 NOTE — TELEPHONE ENCOUNTER
Prior Authorization Not Needed per Insurance    Medication: TRIKAFTA 100-50-75 & 150 MG PO TBPK  Insurance Company: [employer] ([employer]) - Phone [phone] Fax [phone]  Expected CoPay: $ 0  Pharmacy Filling the Rx: [facility] - [place], [place] - [address] [facility]  Pharmacy Notified: No  Patient Notified: No    Per previous experience, [employer] can send PA not required notices but then after current PA expires, new PA request is submitted. Follow up set for 12/23 to try renewing again in case of false alarm.    Key: [id number]

## 2024-12-05 ENCOUNTER — MYC REFILL (OUTPATIENT)
Dept: PULMONOLOGY | Facility: CLINIC | Age: 19
End: 2024-12-05
Payer: COMMERCIAL

## 2024-12-05 DIAGNOSIS — E84.9 CF (CYSTIC FIBROSIS) (H): ICD-10-CM

## 2024-12-05 DIAGNOSIS — K86.89 PANCREATIC INSUFFICIENCY: ICD-10-CM

## 2024-12-05 DIAGNOSIS — J30.1 SEASONAL ALLERGIC RHINITIS DUE TO POLLEN: ICD-10-CM

## 2024-12-05 RX ORDER — CHOLECALCIFEROL (VITAMIN D3) 125 MCG
1 CAPSULE ORAL DAILY
Qty: 100 CAPSULE | Refills: 3 | Status: SHIPPED | OUTPATIENT
Start: 2024-12-05

## 2024-12-05 RX ORDER — CETIRIZINE HYDROCHLORIDE 10 MG/1
10 TABLET ORAL DAILY
Qty: 100 TABLET | Refills: 3 | Status: SHIPPED | OUTPATIENT
Start: 2024-12-05

## 2024-12-05 RX ORDER — MULTIVITAMIN
1 TABLET ORAL DAILY
Qty: 100 TABLET | Refills: 3 | Status: SHIPPED | OUTPATIENT
Start: 2024-12-05

## 2024-12-08 ENCOUNTER — HEALTH MAINTENANCE LETTER (OUTPATIENT)
Age: 19
End: 2024-12-08

## 2025-01-07 DIAGNOSIS — E84.9 CF (CYSTIC FIBROSIS) (H): ICD-10-CM

## 2025-01-21 ENCOUNTER — CLINICAL UPDATE (OUTPATIENT)
Dept: PHARMACY | Facility: CLINIC | Age: 20
End: 2025-01-21
Payer: COMMERCIAL

## 2025-01-21 ENCOUNTER — TELEPHONE (OUTPATIENT)
Dept: PULMONOLOGY | Facility: CLINIC | Age: 20
End: 2025-01-21

## 2025-01-21 DIAGNOSIS — E84.9 CF (CYSTIC FIBROSIS) (H): Primary | ICD-10-CM

## 2025-01-21 PROCEDURE — 99207 PR NO CHARGE LOS: CPT | Performed by: PHARMACIST

## 2025-01-21 NOTE — PROGRESS NOTES
Clinical Update:                                                    A chart review was conducted for Valencia Casiano.    Reason for Chart Review: Trikafta 6 Month Lab Monitoring     Discussion: Valencia has been on Trikafta since  ~2020. Valencia has a history of bilirubin abnormalities and CK elevations, which has required closer monitoring. Per chart review, patient continues full dose Trikafta .     Labs were reviewed from 1/21/2025 at  [facility] . All modulator labs are within normal limits.        Plan:  1. Continue Trikafta   2. Recheck hepatic panel and CK in 6 months         Karla Amaral, PharmD   Cystic Fibrosis MTM Pharmacist  [facility]

## 2025-01-21 NOTE — TELEPHONE ENCOUNTER
Follow up call to patient after message sent by Roro Kirkpatrick.     Reviewed with patient that her level has slowly been trending down.    Patient is wondering if this is why she is so tired.    Discussed with patient that Roro wants her to reach out to her PCP to be seen for the low thyroid level.    Patient verbalized understanding.    Encouraged patient to keep us updated on recommendations from PCP.    BERTRAND Camarillo, RN  RN Care Coordinator [facility]    Patient ----- Message from Roro Kirkpatrick sent at 1/21/2025  1:56 PM CST -----  Regarding: RE: lab work in Care Everywhere  Her TSH was undetectable at < 0.02, otherwise everything looked okay (still with slight elevation in indirect bilirubin).    She needs to see her PCP asap about the TSH level. I do not see a free T4 reflex.  ----- Message -----  From: Parvin Parekh RN  Sent: 1/21/2025   9:53 AM CST  To: Roro Kirkpatrick PA-C; Margie Smith RD; #  Subject: lab work in Care Everywhere                      Good Morning-    Valencia just sent a message that she completed her lab work locally this morning. I have pulled in results to Care Everywhere-  vitamins are still  pending.    Thank you-    BERTRAND Camarillo, RN  RN Care Coordinator [facility]

## 2025-02-05 ENCOUNTER — RESULT FOLLOW UP (OUTPATIENT)
Dept: NUTRITION | Facility: CLINIC | Age: 20
End: 2025-02-05
Payer: COMMERCIAL

## 2025-02-05 NOTE — PROGRESS NOTES
Nutrition Note    Annual studies nutrition results sent by RN CC for RD review. Labs obtained at outside clinic available via fax:    Vitamin A - 51.6 wnl  Vitamin D - 34 wnl  Vitamin E - 5.9 wnl    Interventions/Recommendations:   Nutrition parameters noted to be WNL, no changes made to baseline supplementation regimen.      Margie Smith RD, LD, CACFD  Cystic Fibrosis/Lung Transplant Dietitian  Available via [employer]

## 2025-02-25 DIAGNOSIS — E84.9 CF (CYSTIC FIBROSIS) (H): ICD-10-CM

## 2025-03-03 ENCOUNTER — DOCUMENTATION ONLY (OUTPATIENT)
Dept: PULMONOLOGY | Facility: CLINIC | Age: 20
End: 2025-03-03
Payer: COMMERCIAL

## 2025-03-03 NOTE — PROGRESS NOTES
[facility]  2025         Assessment and Plan:   Valencia Casiano is a 19 year old female with h/o with cystic fibrosis, exocrine pancreatic insufficiency, anxiety and vaping use who is seen today for routine follow up. She was last seen in 2024.      1. Cystic fibrosis: stable currently, was treated for influenza A a few weeks ago. Does not do any airway clearance. Sating 99% on room air. Continues to vape (for anxiety) multiple times/day. Prior cultures + for MRSA from throat cultures, MSSA, Pseudomonas fluorescens, stenotrophomonas, aspergillus (normal vipul on culture from ). No acute issues.  - Nebs and vesting PRN symptoms   - Discussed need to stop vaping, get better control of her anxiety.      2. CFTR modulation: [id number], on Trikafta and tolerating it well. However, found out today patient is not taking any fat with the morning dose, so possible she is not absorbing the medications. LFTs in January WNL.   - Margie to see today re: fat intake  - Continue Trikafta     3. Exocrine pancreatic insufficiency:  Abdominal pain and constipation: with repeat fecal elastase on  < 50. Did not follow up with GI. Continues to have bloating, variable stools (diarrhea to constipation), states she's taking her enzymes most of the time, maybe misses 2-3 times/week. Discussed with Margie, who will see patient today.  - Continue enzymes and vitamins    4. ABPA: prior history, she does not recall if she was on steroids. She is not currently on ICS or steroids. IgE of 55 in 2024.   - IgE with next labs     5. Family planning: Ssen by CHRISTOPHER on  with detailed plan for pregnancy. Valencia and Jesus are now engaged and she has taken out her IUD.  - Margie to discuss nutrition, start a pre  vitamin    6. Depression/anxiety: has a therapist. Saw Penelope 2-3 times and patient was started on a medication and could barely function  (fatigued), so stopped sertraline and never followed up. Notes she is vaping for her anxiety.  - MAYA Reyes, to see today and will get patient back into see Penelope Lira. Reactive hypoglycemia: will have symptoms if she eats candy from breakfast. BS down to 64 on last OGTT.   - Discussed frequent smaller meals  - OGTT due in the fall     RTC: 4 months with labs  Annual studies due: 2026 (OGTT due in the fall, DEXA > 2025)  Preventative care: got her flu shot     Roro Kirkpatrick PA-C  Pulmonary, Allergy, Critical Care and Sleep Medicine        Interval History:     Working 72 hours every two weeks, notes her grandparents moved closer. Got engaged in October, thinking wedding in . Was sick a few weeks ago, thinks it's was influenza (negative test), but did take treatment. Has had some UTIs and is following with her PCP. Got her IUD out and is not using contraception. Always has allergies, does have some nasal congestion in the am. No cough, congestion or tightness. No shortness of breath. No airway clearance other than when she is sick (but didn't do it when sick). Still vaping multiple times/day. Does have some bloating, dependent on diet, using her enzymes, but misses them 2-3 times/week. Having BMs, sometimes diarrhea/harder.          Review of Systems:   Please see HPI. Otherwise, complete 10 point ROS negative.           Past Medical and Surgical History:     No past medical history on file.  No past surgical history on file.        Family History:     No family history on file.         Social History:     Social History     Socioeconomic History    Marital status: Single     Spouse name: Not on file    Number of children: Not on file    Years of education: Not on file    Highest education level: Not on file   Occupational History    Not on file   Tobacco Use    Smoking status: Former     Types: Vaping Device     Quit date: 2024     Years since quittin.5    Smokeless tobacco: Not on file    Substance and Sexual Activity    Alcohol use: Not on file    Drug use: Not on file    Sexual activity: Not on file   Other Topics Concern    Not on file   Social History Narrative    Not on file     Social Drivers of Health     Financial Resource Strain: Not on File (2023)    Received from [facility]    Financial Resource Strain     Financial Resource Strain: 0   Food Insecurity: Not on File (2024)    Received from [facility]    Food Insecurity     Food: 0   Transportation Needs: Not on File (2023)    Received from [facility]    Transportation Needs     Transportation: 0   Physical Activity: Not on File (2023)    Received from [facility]    Physical Activity     Physical Activity: 0   Stress: Not on File (2023)    Received from [facility]    Stress     Stress: 0   Social Connections: Not on File (9/15/2024)    Received from [facility]    Social Connections     Connectedness: 0   Interpersonal Safety: Not on file   Housing Stability: Not on File (2023)    Received from [facility]    Housing Stability     Housin            Medications:     Current Outpatient Medications   Medication Sig Dispense Refill    albuterol (PROAIR HFA/PROVENTIL HFA/VENTOLIN HFA) 108 (90 Base) MCG/ACT inhaler Inhale 1-2 puffs into the lungs every 4 hours as needed for shortness of breath or wheezing 18 g 3    albuterol (PROVENTIL) (2.5 MG/3ML) 0.083% neb solution Take 1 vial (2.5 mg) by nebulization 4 times daily 270 mL 3    albuterol (PROVENTIL) (2.5 MG/3ML) 0.083% neb solution Take 1 vial (2.5 mg) by nebulization 4 times daily as needed for exacerbation 270 mL 3    Biotin 10 MG CAPS Take 1 capsule by mouth daily.      cetirizine (ZYRTEC) 10 MG tablet Take 1 tablet (10 mg) by mouth daily. 100 tablet 3    Cholecalciferol (VITAMIN D) 125 MCG (5000 UT) capsule Take 1 capsule (5,000 Units) by mouth daily. 100 capsule 3    dornase prosper (PULMOZYME) 2.5 MG/2.5ML neb solution Inhale 2.5 mg into the lungs daily  "225 mL 3    elexacaftor-tezacaftor-ivacaftor & ivacaftor (TRIKAFTA) 100-50-75 & 150 MG tablet pack Take 2 orange tablets in the morning and 1 light blue tablet in the evening. Swallow whole with fat-containing food. 2/25: must attend appointment for additional refills, thanks! 84 tablet 5    fluticasone (FLONASE) 50 MCG/ACT nasal spray Spray 2 sprays into both nostrils daily as needed for rhinitis or allergies 16 g 11    lipase-protease-amylase (CREON) 22040-20292-769603 units CPEP per EC capsule Take 4 capsules by mouth 3 times daily (with meals). and 4 capsules with snacks 3 times daily. 3 snacks/3 meals per day. 720 capsule 11    multivitamin (ONE-DAILY) tablet Take 1 tablet by mouth daily. 100 tablet 3    polyethylene glycol (MIRALAX) 17 GM/Dose powder Take 17 g by mouth daily as needed for constipation      sodium chloride inhalant 7 % NEBU neb solution Take 4 mLs by nebulization up to four times daily for exacerbations. 720 mL 3    SUMAtriptan (IMITREX) 25 MG tablet Take 25 mg by mouth at onset of headache for migraine       No current facility-administered medications for this visit.            Physical Exam:   /74   Pulse 91   Ht 1.511 m (4' 11.5\")   Wt 50 kg (110 lb 4.8 oz)   SpO2 99%   BMI 21.91 kg/m      GENERAL: alert, NAD  HEENT: NCAT, EOMI, anicteric sclera, no oral mucosal edema or erythema  Neck: no cervical or supraclavicular adenopathy  Respiratory: good air flow, mainly clear  CV: RRR, S1S2, no murmurs noted  Abdomen: normoactive BS, soft  Lymph: no edema  Neuro: AAO X 3, CN 2-12 grossly intact  Psychiatric: normal affect, good eye contact  Skin: no rash, jaundice or lesions on limited exam         Data:   All laboratory and imaging data reviewed.      Cystic Fibrosis Culture  No results found for: \"SDES\" No results found for: \"CULT\"     PFT interpretation:  Maneuver: valid and meets ATS guidelines  Normal spirometry  FEV1 of 2.85 is 40 ml above prior        "

## 2025-03-05 ENCOUNTER — MYC MEDICAL ADVICE (OUTPATIENT)
Dept: PULMONOLOGY | Facility: CLINIC | Age: 20
End: 2025-03-05
Payer: COMMERCIAL

## 2025-03-06 ENCOUNTER — ALLIED HEALTH/NURSE VISIT (OUTPATIENT)
Dept: CARE COORDINATION | Facility: CLINIC | Age: 20
End: 2025-03-06

## 2025-03-06 ENCOUNTER — OFFICE VISIT (OUTPATIENT)
Dept: PULMONOLOGY | Facility: CLINIC | Age: 20
End: 2025-03-06
Attending: PHYSICIAN ASSISTANT
Payer: COMMERCIAL

## 2025-03-06 ENCOUNTER — OFFICE VISIT (OUTPATIENT)
Dept: PHARMACY | Facility: CLINIC | Age: 20
End: 2025-03-06
Payer: COMMERCIAL

## 2025-03-06 VITALS
BODY MASS INDEX: 21.65 KG/M2 | DIASTOLIC BLOOD PRESSURE: 74 MMHG | SYSTOLIC BLOOD PRESSURE: 121 MMHG | HEART RATE: 91 BPM | OXYGEN SATURATION: 99 % | WEIGHT: 110.3 LBS | HEIGHT: 60 IN

## 2025-03-06 DIAGNOSIS — Z71.9 ENCOUNTER FOR COUNSELING: Primary | ICD-10-CM

## 2025-03-06 DIAGNOSIS — E84.9 CF (CYSTIC FIBROSIS) (H): Primary | ICD-10-CM

## 2025-03-06 DIAGNOSIS — Z78.9 TAKES DIETARY SUPPLEMENTS: ICD-10-CM

## 2025-03-06 DIAGNOSIS — E84.9 CF (CYSTIC FIBROSIS) (H): ICD-10-CM

## 2025-03-06 DIAGNOSIS — E84.9 CYSTIC FIBROSIS (H): Primary | ICD-10-CM

## 2025-03-06 DIAGNOSIS — K86.89 PANCREATIC INSUFFICIENCY: ICD-10-CM

## 2025-03-06 LAB
EXPTIME-PRE: 6.02 SEC
FEF2575-%PRED-PRE: 90 %
FEF2575-PRE: 3.02 L/SEC
FEF2575-PRED: 3.34 L/SEC
FEFMAX-%PRED-PRE: 97 %
FEFMAX-PRE: 6.03 L/SEC
FEFMAX-PRED: 6.2 L/SEC
FEV1-%PRED-PRE: 107 %
FEV1-PRE: 2.85 L
FEV1FEV6-PRE: 86 %
FEV1FEV6-PRED: 87 %
FEV1FVC-PRE: 86 %
FEV1FVC-PRED: 89 %
FIFMAX-PRE: 4.55 L/SEC
FVC-%PRED-PRE: 110 %
FVC-PRE: 3.3 L
FVC-PRED: 2.97 L

## 2025-03-06 PROCEDURE — 99213 OFFICE O/P EST LOW 20 MIN: CPT | Performed by: PHYSICIAN ASSISTANT

## 2025-03-06 PROCEDURE — 97803 MED NUTRITION INDIV SUBSEQ: CPT | Performed by: DIETITIAN, REGISTERED

## 2025-03-06 PROCEDURE — 87070 CULTURE OTHR SPECIMN AEROBIC: CPT | Performed by: PHYSICIAN ASSISTANT

## 2025-03-06 ASSESSMENT — PAIN SCALES - GENERAL: PAINLEVEL_OUTOF10: NO PAIN (0)

## 2025-03-06 NOTE — NURSING NOTE
Chief Complaint   Patient presents with    RECHECK     Return Cystic Fibrosis     Medications reviewed and vital signs taken.   Can Davila, CMA

## 2025-03-06 NOTE — PATIENT INSTRUCTIONS
"Cystic Fibrosis Self-Care Plan       Patient: Valencia Casiano   MRN: [id number]   Clinic Date: March 6, 2025     RECOMMENDATIONS:  1. Continue nebulizers and vest therapy as needed.  2. Consider starting sinus rinses at bedtime (use distilled water).  3. Please schedule a follow up visit with Penelope to discuss your anxiety. We need to help you stop vaping.   4. Call us if you become pregnant.    Annual Studies:   Immunoglobulin G   Date Value Ref Range Status   01/18/2024 885 610 - 1,616 mg/dL Final     No results found for: \"INS\"  There are no preventive care reminders to display for this patient.    Pulmonary Function Tests  FEV1: amount of air you can blow out in 1 second  FVC: total amount of air you can take in and blow out    Your Goals:             Latest Ref Rng & Units 3/6/2025     9:18 AM   PFT   FVC L 3.30  P   FEV1 L 2.85  P   FVC% % 110  P   FEV1% % 107  P      P Preliminary result          Airway Clearance: The Most Important Way to Keep Your Lungs Healthy  Vest Settings:   Hill-Rom Frequencies: 8, 9, 10 Pressure 10 Then, Frequencies 18, 19, 20 Pressure 6     RespirTech: Quick Start with Pressure of     Do each frequency for 5 minutes; Deflate vest after each frequency & cough 3 times before beginning the next setting.    Vest and Neb Therapy should be done as needed.     Good Nutrition Can Improve Lung Function and Overall Health    Take ALL of your vitamins with food    Take 1/2 of your enzymes before EVERY meal/snack and the other 1/2 mid-meal/snack    Wt Readings from Last 3 Encounters:   03/06/25 50 kg (110 lb 4.8 oz)   08/07/24 49.1 kg (108 lb 3.9 oz)   10/12/23 49.9 kg (110 lb 0.2 oz)       Body mass index is 21.91 kg/m .         National CF Foundation Recommendations for BMI in CF Adults: Women: at least 22 Men: at least 23        Controlling Blood Sugars Helps Prevent Lung Infections & Improves Nutrition  Test blood sugar:    In the morning before eating (goal is )    2 hours after " a meal (goal is less than 150)    When pre-meal glucose is greater than 150 add correction    At bedtime (if less than 100 eat a snack with 15 grams of carbohydrates  Last A1C Results:   Hemoglobin A1C   Date Value Ref Range Status   01/18/2024 5.4 <5.7 % Final     Comment:     Normal <5.7%   Prediabetes 5.7-6.4%    Diabetes 6.5% or higher     Note: Adopted from ADA consensus guidelines.         If diabetic, measure A1C every 6 months. Goal is under 7%.    Staying Healthy  Research: If you are interested in learning about research opportunities or have questions, please contact Sandee Neely at [phone] or [email or web address].     Foundation: Compass is a personalized resource service to help you with the insurance, financial, legal and other issues you are facing.  It's free, confidential and available to anyone with CF.  Ask your  for more information or contact Compass directly at [address] ([phone]) or compass@cff.org, or learn more at cff.org/compass.       CF Nurse Line: Rahel Hair and KJ: [phone]  Tracy Malagon or Emerald Pedro RT: [phone]    Cele Chowdhury and Margie Smith , Dieticians: [phone]    Kirsty Garvin, Diabetes Nurse: [phone]   Kitty Hernandez: [phone] or Amy Heller at [phone], Social Workers  [email or web address]

## 2025-03-06 NOTE — LETTER
3/6/2025      Valencia Casiano  [address] [place] [address]      Dear Colleague,    Thank you for referring your patient, Valencia Casiano, to the [facility] [place]. Please see a copy of my visit note below.    [facility]  March 6, 2025         Assessment and Plan:   Valencia Casiano is a 19 year old female with h/o with cystic fibrosis, exocrine pancreatic insufficiency, anxiety and vaping use who is seen today for routine follow up. She was last seen in August 2024.      1. Cystic fibrosis: stable currently, was treated for influenza A a few weeks ago. Does not do any airway clearance. Sating 99% on room air. Continues to vape (for anxiety) multiple times/day. Prior cultures + for MRSA from throat cultures, MSSA, Pseudomonas fluorescens, stenotrophomonas, aspergillus (normal vipul on culture from 6/16). No acute issues.  - Nebs and vesting PRN symptoms   - Discussed need to stop vaping, get better control of her anxiety.      2. CFTR modulation: [id number], on Trikafta and tolerating it well. However, found out today patient is not taking any fat with the morning dose, so possible she is not absorbing the medications. LFTs in January WNL.   - Margie to see today re: fat intake  - Continue Trikafta     3. Exocrine pancreatic insufficiency:  Abdominal pain and constipation: with repeat fecal elastase on 6/22 < 50. Did not follow up with GI. Continues to have bloating, variable stools (diarrhea to constipation), states she's taking her enzymes most of the time, maybe misses 2-3 times/week. Discussed with Margie, who will see patient today.  - Continue enzymes and vitamins    4. ABPA: prior history, she does not recall if she was on steroids. She is not currently on ICS or steroids. IgE of 55 in January of 2024.   - IgE with next labs     5. Family planning: Ssen by LALO on 6/11 with detailed  plan for pregnancy. Valencia and Jesus are now engaged and she has taken out her IUD.  - Margie to discuss nutrition, start a pre adair vitamin    6. Depression/anxiety: has a therapist. Saw Penelope 2-3 times and patient was started on a medication and could barely function (fatigued), so stopped sertraline and never followed up. Notes she is vaping for her anxiety.  - MAYA Reyes, to see today and will get patient back into see Penelope    7. Reactive hypoglycemia: will have symptoms if she eats candy from breakfast. BS down to 64 on last OGTT.   - Discussed frequent smaller meals  - OGTT due in the fall     RTC: 4 months with labs  Annual studies due: 2026 (OGTT due in the fall, DEXA > 2025)  Preventative care: got her flu shot     Roro Kirkpatrick PA-C  Pulmonary, Allergy, Critical Care and Sleep Medicine        Interval History:     Working 72 hours every two weeks, notes her grandparents moved closer. Got engaged in October, thinking wedding in . Was sick a few weeks ago, thinks it's was influenza (negative test), but did take treatment. Has had some UTIs and is following with her PCP. Got her IUD out and is not using contraception. Always has allergies, does have some nasal congestion in the am. No cough, congestion or tightness. No shortness of breath. No airway clearance other than when she is sick (but didn't do it when sick). Still vaping multiple times/day. Does have some bloating, dependent on diet, using her enzymes, but misses them 2-3 times/week. Having BMs, sometimes diarrhea/harder.          Review of Systems:   Please see HPI. Otherwise, complete 10 point ROS negative.           Past Medical and Surgical History:     No past medical history on file.  No past surgical history on file.        Family History:     No family history on file.         Social History:     Social History     Socioeconomic History     Marital status: Single     Spouse name: Not on file     Number of children: Not on  file     Years of education: Not on file     Highest education level: Not on file   Occupational History     Not on file   Tobacco Use     Smoking status: Former     Types: Vaping Device     Quit date: 2024     Years since quittin.5     Smokeless tobacco: Not on file   Substance and Sexual Activity     Alcohol use: Not on file     Drug use: Not on file     Sexual activity: Not on file   Other Topics Concern     Not on file   Social History Narrative     Not on file     Social Drivers of Health     Financial Resource Strain: Not on File (2023)    Received from [facility]    Financial Resource Strain      Financial Resource Strain: 0   Food Insecurity: Not on File (2024)    Received from [facility]    Food Insecurity      Food: 0   Transportation Needs: Not on File (2023)    Received from [facility]    Transportation Needs      Transportation: 0   Physical Activity: Not on File (2023)    Received from [facility]    Physical Activity      Physical Activity: 0   Stress: Not on File (2023)    Received from [facility]    Stress      Stress: 0   Social Connections: Not on File (9/15/2024)    Received from [facility]    Social Connections      Connectedness: 0   Interpersonal Safety: Not on file   Housing Stability: Not on File (2023)    Received from [facility]    Housing Stability      Housin            Medications:     Current Outpatient Medications   Medication Sig Dispense Refill     albuterol (PROAIR HFA/PROVENTIL HFA/VENTOLIN HFA) 108 (90 Base) MCG/ACT inhaler Inhale 1-2 puffs into the lungs every 4 hours as needed for shortness of breath or wheezing 18 g 3     albuterol (PROVENTIL) (2.5 MG/3ML) 0.083% neb solution Take 1 vial (2.5 mg) by nebulization 4 times daily 270 mL 3     albuterol (PROVENTIL) (2.5 MG/3ML) 0.083% neb solution Take 1 vial (2.5 mg) by nebulization 4 times daily as needed for exacerbation 270 mL 3     Biotin 10 MG CAPS Take 1 capsule by mouth daily.   "     cetirizine (ZYRTEC) 10 MG tablet Take 1 tablet (10 mg) by mouth daily. 100 tablet 3     Cholecalciferol (VITAMIN D) 125 MCG (5000 UT) capsule Take 1 capsule (5,000 Units) by mouth daily. 100 capsule 3     dornase prosper (PULMOZYME) 2.5 MG/2.5ML neb solution Inhale 2.5 mg into the lungs daily 225 mL 3     elexacaftor-tezacaftor-ivacaftor & ivacaftor (TRIKAFTA) 100-50-75 & 150 MG tablet pack Take 2 orange tablets in the morning and 1 light blue tablet in the evening. Swallow whole with fat-containing food. 2/25: must attend appointment for additional refills, thanks! 84 tablet 5     fluticasone (FLONASE) 50 MCG/ACT nasal spray Spray 2 sprays into both nostrils daily as needed for rhinitis or allergies 16 g 11     lipase-protease-amylase (CREON) 37253-23776-131903 units CPEP per EC capsule Take 4 capsules by mouth 3 times daily (with meals). and 4 capsules with snacks 3 times daily. 3 snacks/3 meals per day. 720 capsule 11     multivitamin (ONE-DAILY) tablet Take 1 tablet by mouth daily. 100 tablet 3     polyethylene glycol (MIRALAX) 17 GM/Dose powder Take 17 g by mouth daily as needed for constipation       sodium chloride inhalant 7 % NEBU neb solution Take 4 mLs by nebulization up to four times daily for exacerbations. 720 mL 3     SUMAtriptan (IMITREX) 25 MG tablet Take 25 mg by mouth at onset of headache for migraine       No current facility-administered medications for this visit.            Physical Exam:   /74   Pulse 91   Ht 1.511 m (4' 11.5\")   Wt 50 kg (110 lb 4.8 oz)   SpO2 99%   BMI 21.91 kg/m      GENERAL: alert, NAD  HEENT: NCAT, EOMI, anicteric sclera, no oral mucosal edema or erythema  Neck: no cervical or supraclavicular adenopathy  Respiratory: good air flow, mainly clear  CV: RRR, S1S2, no murmurs noted  Abdomen: normoactive BS, soft  Lymph: no edema  Neuro: AAO X 3, CN 2-12 grossly intact  Psychiatric: normal affect, good eye contact  Skin: no rash, jaundice or lesions on limited " "exam         Data:   All laboratory and imaging data reviewed.      Cystic Fibrosis Culture  No results found for: \"SDES\" No results found for: \"CULT\"     PFT interpretation:  Maneuver: valid and meets ATS guidelines  Normal spirometry  FEV1 of 2.85 is 40 ml above prior          Again, thank you for allowing me to participate in the care of your patient.        Sincerely,        Roro Kirkpatrick PA-C    Electronically signed"

## 2025-03-06 NOTE — PROGRESS NOTES
CF Annual Nutrition Visit    Face to Face Time: 15 minutes    Margie Smith RD, LD, CACFD  Cystic Fibrosis/Lung Transplant Dietitian  Available via [employer]

## 2025-03-06 NOTE — PROGRESS NOTES
"Adult Cystic Fibrosis Program  Social Work Clinic Consult    Data:   Valencia endorsed an increase in anxiety lately and provider asked if SW could meet with Valencia to discuss. Met with Valencia to review psychosocial needs and provide counseling as needed.    Valencia reports she has had more anxiety recently. She attributes this to having her 15 year old brother living with her and her partner for the past several months. He has been dealing with CD issues and has been in treatment. This was a lot for Valencia to manage and she felt like she had to parent him. He did just move out back into their mom's house but this has caused anxiety for Valencia as she isn't sure how well he will do there. Valencia reports that growing up she and her brother both struggled because it is \"hard for our mom to show she loves us.\" Valencia reports her brothers dad is also not in the picture so he lacks that support as well.     Valencia reports her living situation has been stressful as well. She and her partner live on her dad's property but he suffered a traumatic brain injury when Valencia was young, and he refuses to get help with this. She reports that he has severe mood swings and he is either fine and nice to Valencia or he is mean to her. We discussed creating some boundaries around seeing her dad to protect herself which Valencia agreed with. She does feel like she can identify early on in the conversation if he is in one of his moods or not, and she feels comfortable leaving if he is. Valencia and her boyfriend are actively looking into buying a house but haven't found anything yet.    Valencia has been seeing her therapist every other week which she does find to be helpful. She previously saw CF psychiatry NP and was put on a med but found it made her incredibly sleepy. SW discussed getting another appointment to discuss a different medication which Valencia will consider. She is aware of how to get a hold of this provider to reschedule. Valencia " endorses she has also been vaping daily to deal with the stress so we discussed harm reduction techniques. We also discussed utilizing healthy coping skills and Valencia was able to identify that getting outside, listening to music, and going for walks does help her.     Intervention:  -Counseling: facilitating processing of feelings and thoughts; motivational interviewing; eliciting/encouraging use of coping skills; counseling related to close relationship(s); psycho-education; coping skills education    Assessment: Valencia was friendly and receptive to SW visit and was very open to discussion. She has anxiety at baseline but notes it has been worse due to some family stressors. She is seeing a therapist consistently but is no longer taking medication for her anxiety or mood. She was open to discussion on coping skills and will consider making an appointment with CF psychiatry provider.     Plan:   -Continue to assist with mental health concern(s).  -Continue to follow through regular clinic consult.  -Continue to follow for any psychosocial needs that may arise.  -Complete full psychosocial assessment annually.     Amy Heller [facility]  Adult Cystic Fibrosis   Ph: [phone]    Message me securely with Joce

## 2025-03-06 NOTE — PROGRESS NOTES
Medication Therapy Management (MTM) Encounter    ASSESSMENT:                            Medication Adherence/Access: See below for considerations.    CF:   Reviewed limited data for safety of Trikafta in pregnancy however current case reports are positive. Shared decision to continue Trikafta at this time given positive impact on health and lung function .     Pancreatic Insufficiency/Nutrition:   Patient would benefit from starting prenatal per dietitian        PLAN:                            Keep up the great work on medications! Refills sent to pharmacy  Trikafta labs due in July. Shared decision to continue Trikafta while pursuing pregnancy  Start prenatal as directed by dietitian    Follow-up: per Roro Kirkpatrick PA-C      SUBJECTIVE/OBJECTIVE:                          Valencia Casiano is a 19 year old female seen for a co-visit with Roro Kirkpatrick PA-C.     Reason for visit: Annual Medication Review    Allergies/ADRs: Reviewed in chart  Past Medical History: Reviewed in chart  Tobacco: She reports that she has been smoking vaping device. She has been exposed to tobacco smoke. She does not have any smokeless tobacco history on file.  Alcohol: 2 drinks, 2 to 3 times per month  THC/CBD: none  Other Substance Use: none    Medication Adherence/Access:    Medication: Valencia manages her own medications at home  Pharmacy: [facility] and [facility]  No concerns with medication cost or access today  Curious about medication safety if she were to become pregnant, recently had IUD removed      CF:   Inhaled medications:  Bronchodilator: albuterol nebs as needed (rarely) and albuterol HFA as needed  Mucolytic: hypertonic saline 7% nebs as needed (none recently)  Other: Flonase as needed (none recently)  Oral medications:  CFTR modulator: Trikafta (full dose)   Other: cetirizine 10 mg daily    Genotype: [id number]  Patient reports no concerns about CF medication regimen.      Pancreatic  "Insufficiency/Nutrition:   Creon 15636 taking 4 capsules with meals and 4 capsules with snacks.   Bowel regimen: Miralax as needed (none recently)  Vitamins: women's multivitamin daily, vitamin 5000 units daily, biotin 10mg daily    Patient is experiencing sign/symptoms of malabsorption/intermittent abdominal pain, meeting with dietitian. Planning to switch to prenatal per visit with dietitian.      Today's Vitals:   BP Readings from Last 1 Encounters:   03/06/25 121/74     Pulse Readings from Last 1 Encounters:   03/06/25 91     Wt Readings from Last 1 Encounters:   03/06/25 110 lb 4.8 oz (50 kg)     Ht Readings from Last 1 Encounters:   03/06/25 4' 11.5\" (1.511 m)     Estimated body mass index is 21.91 kg/m  as calculated from the following:    Height as of an earlier encounter on 3/6/25: 4' 11.5\" (1.511 m).    Weight as of an earlier encounter on 3/6/25: 110 lb 4.8 oz (50 kg).      ----------------      I spent 10 minutes with this patient today. I offer these suggestions for consideration by Roro Kirkpatrick PA-C during covisit today.     A summary of these recommendations was given to the patient (see AVS from today's appointment with Roro Kirkpatrick PA-C).    Charley Shepherd, PharmD  Cystic Fibrosis MTM Pharmacist  [facility]  Voicemail: [phone]           Medication Therapy Recommendations  No medication therapy recommendations to display   "

## 2025-03-06 NOTE — PATIENT INSTRUCTIONS
See provider AVS for a summary of recommendations from today's visit.    Charley Shepherd, PharmD  Cystic Fibrosis MTM Pharmacist  [facility]  Voicemail: [phone]

## 2025-03-10 NOTE — TELEPHONE ENCOUNTER
Action March 10, 2025  12:11 PM MMT   Action Taken  Request faxed to [facility] for images to be pushed to PACS.        RECORDS RECEIVED FROM: Internal   DATE RECEIVED: 3/10/25   NOTES STATUS DETAILS   OFFICE NOTE from referring provider Internal 3/6/25 - Roro Kirkpatrick PA-C - [facility] Pulm      OFFICE NOTE from other specialist Internal / CE 1/5/23 - Raphael Taylor APRN CNP - [facility] GI    DISCHARGE REPORT from the ER Care Everywhere 12/18/22 - [facility] Lucretia Bucio DO     7/3/22 - [facility] Lucretia Bucio DO      MEDICATION LIST Internal    IMAGING  (NEED IMAGES AND REPORTS)     CT SCAN In Process [place]:  CT Abd Pel - 12/18/22     CHEST XRAY (CXR) In Process Internal:  XR Chest - 8/7/24  XR Gastrografin enema - 9/13/23  DEXA - 1/18/24    [place]:   XR Chest - 9/23/24  XR Abdomen - 9/19/23, 9/12/23, 9/5/23, 8/30/23, 8/23/23, 1/5/23, 7/3/22     TESTS     PULMONARY FUNCTION TESTING (PFT) Internal 3/6/25, 8/7/24, 1/18/24, 10/12/23, 6/16/23

## 2025-03-11 LAB — BACTERIA SPEC CULT: NORMAL

## 2025-03-13 DIAGNOSIS — K86.89 PANCREATIC INSUFFICIENCY: ICD-10-CM

## 2025-03-13 DIAGNOSIS — E84.9 CF (CYSTIC FIBROSIS) (H): Primary | ICD-10-CM

## 2025-03-13 NOTE — PROGRESS NOTES
Nutrition Note    Discussed enzyme change during recent clinic visit. Pt has insurance coverage with Zenpep 20,000 - 4 caps with meals (600/mo), estimated $50 copay. Will encourage pt to enroll in [employer] for copay assistance. Updated prescription to [facility].       Margie Smith RD, LD, CACFD  Cystic Fibrosis/Lung Transplant Dietitian  Available via enrich-in

## 2025-03-27 ENCOUNTER — VIRTUAL VISIT (OUTPATIENT)
Dept: PULMONOLOGY | Facility: CLINIC | Age: 20
End: 2025-03-27
Attending: DIETITIAN, REGISTERED
Payer: COMMERCIAL

## 2025-03-27 ENCOUNTER — PRE VISIT (OUTPATIENT)
Dept: PULMONOLOGY | Facility: CLINIC | Age: 20
End: 2025-03-27
Payer: COMMERCIAL

## 2025-03-27 ENCOUNTER — PATIENT OUTREACH (OUTPATIENT)
Dept: GASTROENTEROLOGY | Facility: CLINIC | Age: 20
End: 2025-03-27

## 2025-03-27 VITALS — BODY MASS INDEX: 22.58 KG/M2 | WEIGHT: 115 LBS | HEIGHT: 60 IN

## 2025-03-27 DIAGNOSIS — K62.5 RECTAL BLEEDING: ICD-10-CM

## 2025-03-27 DIAGNOSIS — K59.00 CONSTIPATION, UNSPECIFIED CONSTIPATION TYPE: ICD-10-CM

## 2025-03-27 DIAGNOSIS — R14.0 BLOATING: ICD-10-CM

## 2025-03-27 DIAGNOSIS — E84.9 CF (CYSTIC FIBROSIS) (H): Primary | ICD-10-CM

## 2025-03-27 ASSESSMENT — PAIN SCALES - GENERAL: PAINLEVEL_OUTOF10: NO PAIN (0)

## 2025-03-27 NOTE — PATIENT INSTRUCTIONS
I've included a brief summary of our discussion and care plan from today's visit below.  Please review this information with your primary care provider.  _______________________________________________________________________      Valencia , It was wonderful getting a chance to meet with you to discuss your symptoms.     Here is the plan we discussed:    -- Get abdominal x-ray to assess stool burden   --------- If this shows stool we will plan for Go-lytely clean out and starting daily medication for constipation such as Linzess or Amitiza  -------- You can continue to use Miralax as needed  -- Get lab work for celiac disease  -- Get colonoscopy  -- Rolaids as needed for heartburn  -- If Rolaids do not provide relief, try pepcid (famotidine) up twice daily  -- Ok to try Zenpep instead of Creon    If you have any questions, please do not hesitate to contact our nurse, Norma Payne, at [phone]. Fax # [phone]. (Attn: Emilie Cantu)      Here are some numbers you may find useful now or in the future:    Imaging Appointments: [phone]    Schedule Follow Up Clinic Appts: [phone]   Clinic Fax Number: [phone]    Procedure/Endoscopy [phone]      - Contact us via Inaikah[employer] or phone should these symptoms occur, particularly as we may advise further evaluation accordingly.     Return to GI Clinic in 6 months  - If you are unable to schedule this follow-up appointment today, please contact our  at [phone] within the next week to help set up this necessary appointment.    Sincerely,  Emilie Cantu PA-C  Division of Gastroenterology, Hepatology & Nutrition  [facility]

## 2025-03-27 NOTE — PROGRESS NOTES
Received a message from the provider to fax the abdominal x-ray order and celiac lab orders to the the [facility] in [place]   Orders faxed   Spoke with Valencia and provided the update

## 2025-03-27 NOTE — NURSING NOTE
Current patient location:  At work     Is the patient currently in the state of [place]? YES    Visit mode: VIDEO    If the visit is dropped, the patient can be reconnected by:VIDEO VISIT: Text to cell phone:   Telephone Information:   Mobile [phone]       Will anyone else be joining the visit? NO  (If patient encounters technical issues they should call [phone]    Are changes needed to the allergy or medication list? No    Are refills needed on medications prescribed by this physician? NO    Rooming Documentation:  Questionnaire(s) completed    Reason for visit: Consult    Sherry PURCELL

## 2025-03-27 NOTE — LETTER
3/27/2025      Valencia Casiano  [address] [place] [address]      Dear Colleague,    Thank you for referring your patient, Valencia Casiano, to the [facility] AND [facility]. Please see a copy of my visit note below.    Virtual Visit Details    Type of service:  Video Visit     Originating Location (pt. Location): Home    Distant Location (provider location):  On-site  Platform used for Video Visit: [facility]      CYSTIC FIBROSIS GI CLINIC VISIT - RETURN PATIENT    CC/REFERRING PROVIDER: Khanh Kirkpatrick  REASON FOR CONSULTATION: abdominal pain, constipation    HPI: 19 year old female with history of cystic fibrosis (on Trikafta), pancreatic insufficiency, anxiety who presents for evaluation of abdominal pain and constipation.    Seen previously in CF GI clinic 10/2023 by Dr. Saldivar for constipation.    History of constipation requiring outpatient cleanouts and gastrografin enemas. She was using Miralax 1-2 per day, however stopped as she did not feel this was working. Repeat fecal elastase 2023 at outside clinic <50 confirming severe EPI.    She has continued to have bloating, variable stools with constipation and diarrhea, bloating.    She reports stools are variable will have 1-4 days of diarrhea with 2-5 loose, urgent stools, rare incontinence. Then will have days of constipation with no stools or hard stools, strains. Occasional BRBPR after a hard stool, primarily with wiping. Uses Miralax BID x few days to stimulate BM, occasionally uses dulocolax chews, not on a regular regimen at this time. Never feels empty regardless of bowel pattern. Notes LLQ pain, feels like stool is backing up/hard when she goes without stool. Tries to massage which helps.     She also notes longstanding daily bloating. Eating can make worse, especially fatty foods. BM can sometimes make better but not always.     Reports occasional GERD, 1-2 days per month, will last for a few days.  Rolaids helpful.     Met with KAREN Hanson RD earlier this month, prescription sent for change from Creon to Zenpep to see if this helps with GI concerns. Has not started yet.     Mom with UC. No colon cancer or other GI malignancies.         GI Related Medications:  Bowel meds:  -- Miralax PRN  -- Dulcolax chews PRN  Enzymes:  -- Creon- 4 caps with meals, 2 with snacks  Acid suppression:  -- Rolaids as needed, helps    ROS: 10pt ROS performed and otherwise negative.    PERTINENT PAST MEDICAL HISTORY:  As noted above.  No past medical history on file.     PREVIOUS ABDOMINAL/GYNECOLOGIC SURGERIES:  As noted above.  No past surgical history on file.    PERTINENT MEDICATIONS:  Current Outpatient Medications   Medication Sig Dispense Refill     albuterol (PROAIR HFA/PROVENTIL HFA/VENTOLIN HFA) 108 (90 Base) MCG/ACT inhaler Inhale 1-2 puffs into the lungs every 4 hours as needed for shortness of breath or wheezing 18 g 3     albuterol (PROVENTIL) (2.5 MG/3ML) 0.083% neb solution Take 1 vial (2.5 mg) by nebulization 4 times daily (Patient not taking: Reported on 3/6/2025) 270 mL 3     Biotin 10 MG CAPS Take 1 capsule by mouth daily.       cetirizine (ZYRTEC) 10 MG tablet Take 1 tablet (10 mg) by mouth daily. 100 tablet 3     Cholecalciferol (VITAMIN D) 125 MCG (5000 UT) capsule Take 1 capsule (5,000 Units) by mouth daily. 100 capsule 3     dornase prosper (PULMOZYME) 2.5 MG/2.5ML neb solution Inhale 2.5 mg into the lungs daily (Patient not taking: Reported on 3/6/2025) 225 mL 3     elexacaftor-tezacaftor-ivacaftor & ivacaftor (TRIKAFTA) 100-50-75 & 150 MG tablet pack Take 2 orange tablets in the morning and 1 light blue tablet in the evening. Swallow whole with fat-containing food. 2/25: must attend appointment for additional refills, thanks! 84 tablet 5     fluticasone (FLONASE) 50 MCG/ACT nasal spray Spray 2 sprays into both nostrils daily as needed for rhinitis or allergies 16 g 11     lipase-protease-amylase (ZENPEP)  02553-16[address]-39123 units CPEP Take 4 capsules by mouth 3 times daily (with meals). Take 2 capsules with snacks. 3 meals and 3 snacks daily. 600 capsule 11     polyethylene glycol (MIRALAX) 17 GM/Dose powder Take 17 g by mouth daily as needed for constipation       Prenatal Vit-Fe Fumarate-FA (PRENATAL PO) Take 1 capsule by mouth daily.       sodium chloride inhalant 7 % NEBU neb solution Take 4 mLs by nebulization up to four times daily for exacerbations. (Patient not taking: Reported on 3/6/2025) 720 mL 3     SUMAtriptan (IMITREX) 25 MG tablet Take 25 mg by mouth at onset of headache for migraine       No current facility-administered medications for this visit.      - Anticoagulation/Antiplatelet Agents: none  Medications reviewed with patient today, see Medication List/Assessment for details.  No other NSAID/anticoagulation reported by patient.  No other OTC/herbal/supplements reported by patient.    SOCIAL HISTORY:  Tobacco: none  Alcohol: occasionally   Drugs: no  Cannabis: no  Work/fun:     FAMILY HISTORY:  No colon/panc/esophageal/other GI CA, no other Chapman or other HPS-related Darron. No IBD/celiac, no other AI/liver/thyroid disease. No known FH bleeding/clotting disorders.  No family history on file.     PHYSICAL EXAMINATION:  General appearance: Healthy appearing adult, in no acute distress  Eyes: Sclera anicteric, Pupils round and reactive to light  Ears, nose, mouth and throat: No obvious external lesions of ears and nose, Hearing intact  Neck: Symmetric, No obvious external lesions  Respiratory: Normal respiration, no use of accessory muscles   MSK: Gait normal  Skin: No rashes or jaundice   Psychiatric: Oriented to person, place and time, Appropriate mood and affect.       PREVIOUS ENDOSCOPY:  No prior endoscopy    PERTINENT STUDIES:  Imaging  KUB 9/19/23 - moderate stool burden  KUB 9/5/23 - large stool burden  KUB 8/30/23 - moderate stool burden  KUB 1/5/23 - large stool burden  KUB 7/3/22 - normal  stool burden    CT A/P 12/19/22  1. Mild peripancreatic stranding which may represent pancreatitis.  Correlate   with lipase.   2. There is bronchiectasis and pancreatic atrophy consistent with patient's   cystic fibrosis.   3. The appendix is borderline at 7 mm.  No other inflammatory changes are seen   in the right lower quadrant.  Correlate clinically.     Labs  Fecal elastase <50 6/2023  LFTs wnl 1/21/25  CBC wnl 1/21/25      ASSESSMENT/PLAN:    #Irregular Bowel Pattern - alternating constipation and diarrhea  #Intermittent BRBPR  #Bloating  #Intermittent abdominal pain, LLQ  Patient with history significant for constipation, multiple imaging studies in 2023 with significant stool burden, eventually improved with go-lytely and gastrograffin enemas. More recently has been alternating between constipation (days without stool, hard stool) and diarrhea (2-5 liquid stools per day). She never feels completely evacuated regardless of stool consistency. She has history of interment rectal bleeding, typically after passing hard/painful stool, prior anaoscopy with PCP was normal. She has LLQ pain intermittently that is associated with constipation, resolves after BM. She also has longstanding bloating. Suspect that symptoms are related to underlying constipation d/t CF, dysmotility, possible pelvic floor dysfunction. Suspect diarrhea is overflow/emptying. SIBO and/or pancreatic insufficiency may be contributing to bloating. Recommend that we get abdominal XR to assess stool burden, if this shows stool recommend clean out and starting daily secretagogue - likely Amitiza 8 mcg BID if approved by insurance. Given bloating irregular stools, also recommend checking celiac serologies. Lastly, most suspicious that intermittently BRBPR related to fissures/hemorrhoids, however given this with FH of UC, personal history of CF and constipation/diarrhea would recommend getting colonoscopy for full mucosal assessment.     -- Get  abdominal x-ray to assess stool burden   --------- If this shows stool we will plan for Go-lytely clean out and starting daily medication for constipation such as Linzess or Amitiza  -------- You can continue to use Miralax as needed  -- Get lab work for celiac disease  -- Get colonoscopy    #GERD  Intermittent heartburn that lasts 1-2 days at a time, 1-2 times per months. Possibly worsened by underlying constipation. No dysphagia or odynophagia. Uses Rolaids which is helpful, can continue this. Can also try famotidine as needed. If becomes more persistent consider PPI/EGD.  -- Rolaids as needed for heartburn  -- If Rolaids do not provide relief, try pepcid (famotidine) up twice daily    #Pancreatic Insuffiencey  Patient with pancreatic insuffiencey related to cystic fibrosis. Currently taking Creon, but plans to switch to Zenpep to see if this helps with bloating, agree with trying this. No signs of symptoms of malabsorption. Weight stable. Continue PERT and vitamin/mineral monitoring and replacement per CF dietitian.  -- Ok to try Zenpep instead of Creon    #CRC Screening  Start Colorectal Cancer Screening at age 40.      RTC 6, sooner if symptomatic.     Thank you for this consultation. It was a pleasure to participate in the care of this patient; please contact us with any further questions.    60 Minutes was spent on the date of the encounter during chart review, history and exam, documentation, and further activities as noted     Emilie Cantu PA-C  Division of Gastroenterology, Hepatology & Nutrition  [facility]      No orders of the defined types were placed in this encounter.         Again, thank you for allowing me to participate in the care of your patient.        Sincerely,        Emilie Cantu PA-C    Electronically signed

## 2025-03-31 ENCOUNTER — TRANSFERRED RECORDS (OUTPATIENT)
Dept: HEALTH INFORMATION MANAGEMENT | Facility: CLINIC | Age: 20
End: 2025-03-31
Payer: COMMERCIAL

## 2025-04-03 ENCOUNTER — TELEPHONE (OUTPATIENT)
Dept: PULMONOLOGY | Facility: CLINIC | Age: 20
End: 2025-04-03
Payer: COMMERCIAL

## 2025-04-03 NOTE — TELEPHONE ENCOUNTER
Patient confirmed scheduled appointment:  Date: 11/13/25  Time: 8:00AM  Visit type: F  Provider: JONATHAN GARRIDO  Location: [facility]  Testing/imaging: N/A  Additional notes: N/A

## 2025-04-27 ENCOUNTER — HEALTH MAINTENANCE LETTER (OUTPATIENT)
Age: 20
End: 2025-04-27

## 2025-06-23 ENCOUNTER — TELEPHONE (OUTPATIENT)
Dept: PULMONOLOGY | Facility: CLINIC | Age: 20
End: 2025-06-23
Payer: COMMERCIAL

## 2025-06-23 NOTE — TELEPHONE ENCOUNTER
STEFFANY APPROVED    Medication: TRIKAFTA 100-50-75 & 150 MG PO TBPK  Amount: $ 15,000  [facility] Name: [facility] Effective Date: 7/16/2025  Foundation Expiration Date: 7/15/2026  Additional Information:   Patient Notified:

## 2025-08-10 ENCOUNTER — HEALTH MAINTENANCE LETTER (OUTPATIENT)
Age: 20
End: 2025-08-10